# Patient Record
(demographics unavailable — no encounter records)

---

## 2024-10-30 NOTE — BEGINNING OF VISIT
[0] : 2) Feeling down, depressed, or hopeless: Not at all (0) [PHQ-2 Negative] : PHQ-2 Negative [PHQ-9 Negative] : PHQ-9 Negative [[id number]] : 0 [Pain Scale: ___] : On a scale of 1-10, today the patient's pain is a(n) [unfilled]. [Never] : Never [Date Discussed (MM/DD/YY): ___] : Discussed: [unfilled] [With Patient/Caregiver] : with Patient/Caregiver

## 2024-10-30 NOTE — BEGINNING OF VISIT
[0] : 2) Feeling down, depressed, or hopeless: Not at all (0) [PHQ-2 Negative] : PHQ-2 Negative [PHQ-9 Negative] : PHQ-9 Negative [[id number]] : 0 [Pain Scale: ___] : On a scale of 1-10, today the patient's pain is a(n) [unfilled]. [Never] : Never [Date Discussed (MM/DD/YY): ___] : Discussed: [unfilled] [With Patient/Caregiver] : with Patient/Caregiver

## 2024-10-30 NOTE — BEGINNING OF VISIT
[0] : 2) Feeling down, depressed, or hopeless: Not at all (0) [PHQ-2 Negative] : PHQ-2 Negative [PHQ-9 Negative] : PHQ-9 Negative [[id number]] : 0 [Pain Scale: ___] : On a scale of 1-10, today the patient's pain is a(n) [unfilled]. [Never] : Never [Date Discussed (MM/DD/YY): ___] : Discussed: [unfilled] [With Patient/Caregiver] : with Patient/Caregiver

## 2024-10-30 NOTE — BEGINNING OF VISIT
[0] : 2) Feeling down, depressed, or hopeless: Not at all (0) [PHQ-2 Negative] : PHQ-2 Negative [PHQ-9 Negative] : PHQ-9 Negative [[id number]] : 0 [Pain Scale: ___] : On a scale of 1-10, today the patient's pain is a(n) [unfilled]. [Never] : Never [Date Discussed (MM/DD/YY): ___] : Discussed: [unfilled] [With Patient/Caregiver] : with Patient/Caregiver

## 2024-11-01 NOTE — PHYSICAL EXAM
[Restricted in physically strenuous activity but ambulatory and able to carry out work of a light or sedentary nature] : Status 1- Restricted in physically strenuous activity but ambulatory and able to carry out work of a light or sedentary nature, e.g., light house work, office work [Normal] : affect appropriate [de-identified] : ostomy - looks well.

## 2024-11-01 NOTE — PHYSICAL EXAM
[Restricted in physically strenuous activity but ambulatory and able to carry out work of a light or sedentary nature] : Status 1- Restricted in physically strenuous activity but ambulatory and able to carry out work of a light or sedentary nature, e.g., light house work, office work [Normal] : affect appropriate [de-identified] : ostomy - looks well.

## 2024-11-01 NOTE — ASSESSMENT
[Curative] : Goals of care discussed with patient: Curative [FreeTextEntry1] : # Perforated rectal cancer, adenocarcinoma. - MMRp. - 04/08/2024 s/p sigmoid loop colostomy, was on IV antibiotics. - no metastasis on CT C/A/P - Rectal MRI for staging showed bulky disease 8.4 cm, T3C N0 - start FOLFOX today but unfortunately port was not placed and is pending on Friday, she is feeling well. Plan was to add irinotecan in the future, but she is very hesitant about FOLFIRINOX and we had an extensive discussion I explained the possible benefits of additional irinotecan but decided to proceed with just CapeOx for 3 to 4 months of treatment will aim for 6 cycles followed by long course chemoradiation. - We spoke about side effects of oxaliplatin before we spoke with capecitabine today which included rash as well as diarrhea rarely DPD deficiency severe mucositis and she is aware of approximately 1% chance of death - 05/06/2024 MR pelvis/rectal/anal - Status post diverting sigmoid loop colostomy. Small fat-containing paraumbilical hernia. A 3.2 x 3.0 x 2.3 cm mixed cystic and solid lesion within the central portion of the uterus possibly reflecting a submucosal fibroid or adenomyoma protruding into the endometrial cavity versus a true endometrial lesion. IMPRESSION: 1. Mid to high rectal tumor measuring 8.4 cm straddles the anterior peritoneal reflection, with evidence of EMVI; tumor extends beyond muscularis propria along the retrocervical region and contacts but does not definitely invade the posterior cervix. T category: T3c; 2. Mildly irregular 1.0 cm left external iliac lymph node. 3. A 3.2 cm mixed cystic and solid lesion within the central portion of the uterus was likely present dating back to August 2019, possibly reflecting a submucosal fibroid or adenomyoma protruding into the endometrial cavity; GYN consultation may be of use. - 06/11/2024 started CapeOx as Oxaliplatin and capecitabine 14/21D Q21D. - 06/16/2024 CT A/P - Unchanged oncologic findings as above with large perirectal mass and abscess now contains a small focus of air. Stable resulting right hydronephrosis from obstructing the distal ureter. No emergent findings identified. Refer to prior report from 5/21/2024 for further details. - 07/03/2024 capecitabine dose reduction to 1000 mg PO BID 14/21D due to nausea and vomiting that resolved post IV hydration with Zofran and Pepcid IV. - 09/23/2024 MR Rectal - Since the prior examination on 5/6/2024, the primary tumor shows: Partial treatment response (status post 4 of 6 cycles of chemotherapy). No new lesions. Interval decrease in size of the known mid to high rectal tumor now measuring 6.0 cm in craniocaudal dimension, previously 8.4 cm. There remains gross invasion of the perirectal fat contacting the mesorectal fascia posteriorly (8/23) and the anterior peritoneal reflection (8/13). No lymphadenopathy.  09/25/2024 Labs reviewed and results discussed with the patient and her son; 09/23/2024 MR rectal shows response to the treatment.  Repeat CT chest abdomen and pelvis is pending. She is aware to proceed to soon as possible. If doing well will complete 6 cycles and then offer chemo RT followed by likely surgery unless PAXTON. Her anemia significantly improved on parenteral Venofer - remains clinically stable to continue current management with 20% dose reduction of oxaliplatin due to peripheral neuropathy. Although her family and financial situation may cause interruption of treatment. She is able to continue with today's treatment, but not sure if she will be able to receive the last 6th cycle as she may lose her insurance as of the beginning of October. She is working toward not losing her insurance.  And from our side, we will research all options on how to accommodate the patient with the regiment that she can return back to work.  All questions were answered to satisfaction.  Richa is here for follow-up she had an MRI of the rectal that shows treatment response but will likely need radiation with chemotherapy as well.  She still has repeat chest and abdomen CAT scan pending.  Will continue with cycle 5 of 6 of CapeOx. She is not sure if she could proceed with long course chemoradiation but will speak to her more about this again. We may be able to possibly offer her short course RT if okay with the radiation colleagues. Her main issue is that she has to go back to work.  Will see what CTs are showing and will have her see radiation and finalize treatment plan once she sees radiation oncology  PLAN: - continue CapeOx as Oxaliplatin 130 mg/m2 with 20% dose reduction = 104 mg/m2 = 145 mg IV and capecitabine 1000 mg BID 14/21D for total of 6 cycles - C6/6 GIVE TODAY - LAST TREATMENT. - continue NaCl 0.9% 1000 ml IV hydration with Zofran 8 mg IV and Pepcid 20 mg IV starting on D2 of each cycle. - continue Tramadol 50 mg PO Q8H PRN for pain - ordered. - repeat CT C/A/P scheduled in about 2 weeks in about 11/2024. - repeat MR rectum scheduled on 11/20/2024. - Referred to [facility] for the next step with chemo RT. - Labs today: CBC CMP Mg RTC in 3 weeks with CBC CMP Mg

## 2024-11-01 NOTE — PHYSICAL EXAM
[Restricted in physically strenuous activity but ambulatory and able to carry out work of a light or sedentary nature] : Status 1- Restricted in physically strenuous activity but ambulatory and able to carry out work of a light or sedentary nature, e.g., light house work, office work [Normal] : affect appropriate [de-identified] : ostomy - looks well.

## 2024-11-01 NOTE — ASSESSMENT
[Curative] : Goals of care discussed with patient: Curative [FreeTextEntry1] : # Perforated rectal cancer, adenocarcinoma. - MMRp. - 04/08/2024 s/p sigmoid loop colostomy, was on IV antibiotics. - no metastasis on CT C/A/P - Rectal MRI for staging showed bulky disease 8.4 cm, T3C N0 - start FOLFOX today but unfortunately port was not placed and is pending on Friday, she is feeling well. Plan was to add irinotecan in the future, but she is very hesitant about FOLFIRINOX and we had an extensive discussion I explained the possible benefits of additional irinotecan but decided to proceed with just CapeOx for 3 to 4 months of treatment will aim for 6 cycles followed by long course chemoradiation. - We spoke about side effects of oxaliplatin before we spoke with capecitabine today which included rash as well as diarrhea rarely DPD deficiency severe mucositis and she is aware of approximately 1% chance of death - 05/06/2024 MR pelvis/rectal/anal - Status post diverting sigmoid loop colostomy. Small fat-containing paraumbilical hernia. A 3.2 x 3.0 x 2.3 cm mixed cystic and solid lesion within the central portion of the uterus possibly reflecting a submucosal fibroid or adenomyoma protruding into the endometrial cavity versus a true endometrial lesion. IMPRESSION: 1. Mid to high rectal tumor measuring 8.4 cm straddles the anterior peritoneal reflection, with evidence of EMVI; tumor extends beyond muscularis propria along the retrocervical region and contacts but does not definitely invade the posterior cervix. T category: T3c; 2. Mildly irregular 1.0 cm left external iliac lymph node. 3. A 3.2 cm mixed cystic and solid lesion within the central portion of the uterus was likely present dating back to August 2019, possibly reflecting a submucosal fibroid or adenomyoma protruding into the endometrial cavity; GYN consultation may be of use. - 06/11/2024 started CapeOx as Oxaliplatin and capecitabine 14/21D Q21D. - 06/16/2024 CT A/P - Unchanged oncologic findings as above with large perirectal mass and abscess now contains a small focus of air. Stable resulting right hydronephrosis from obstructing the distal ureter. No emergent findings identified. Refer to prior report from 5/21/2024 for further details. - 07/03/2024 capecitabine dose reduction to 1000 mg PO BID 14/21D due to nausea and vomiting that resolved post IV hydration with Zofran and Pepcid IV. - 09/23/2024 MR Rectal - Since the prior examination on 5/6/2024, the primary tumor shows: Partial treatment response (status post 4 of 6 cycles of chemotherapy). No new lesions. Interval decrease in size of the known mid to high rectal tumor now measuring 6.0 cm in craniocaudal dimension, previously 8.4 cm. There remains gross invasion of the perirectal fat contacting the mesorectal fascia posteriorly (8/23) and the anterior peritoneal reflection (8/13). No lymphadenopathy.  09/25/2024 Labs reviewed and results discussed with the patient and her son; 09/23/2024 MR rectal shows response to the treatment.  Repeat CT chest abdomen and pelvis is pending. She is aware to proceed to soon as possible. If doing well will complete 6 cycles and then offer chemo RT followed by likely surgery unless PAXTON. Her anemia significantly improved on parenteral Venofer - remains clinically stable to continue current management with 20% dose reduction of oxaliplatin due to peripheral neuropathy. Although her family and financial situation may cause interruption of treatment. She is able to continue with today's treatment, but not sure if she will be able to receive the last 6th cycle as she may lose her insurance as of the beginning of October. She is working toward not losing her insurance.  And from our side, we will research all options on how to accommodate the patient with the regiment that she can return back to work.  All questions were answered to satisfaction.  Richa is here for follow-up she had an MRI of the rectal that shows treatment response but will likely need radiation with chemotherapy as well.  She still has repeat chest and abdomen CAT scan pending.  Will continue with cycle 5 of 6 of CapeOx. She is not sure if she could proceed with long course chemoradiation but will speak to her more about this again. We may be able to possibly offer her short course RT if okay with the radiation colleagues. Her main issue is that she has to go back to work.  Will see what CTs are showing and will have her see radiation and finalize treatment plan once she sees radiation oncology  PLAN: - continue CapeOx as Oxaliplatin 130 mg/m2 with 20% dose reduction = 104 mg/m2 = 145 mg IV and capecitabine 1000 mg BID 14/21D for total of 6 cycles - C6/6 GIVE TODAY - LAST TREATMENT. - continue NaCl 0.9% 1000 ml IV hydration with Zofran 8 mg IV and Pepcid 20 mg IV starting on D2 of each cycle. - continue Tramadol 50 mg PO Q8H PRN for pain - ordered. - repeat CT C/A/P scheduled in about 2 weeks in about 11/2024. - repeat MR rectum scheduled on 11/20/2024. - Referred to [facility] for the next step with chemo RT. - Labs today: CBC CMP Mg RTC in 3 weeks with CBC CMP Mg

## 2024-11-01 NOTE — HISTORY OF PRESENT ILLNESS
[Disease: _____________________] : Disease: [unfilled] [T: ___] : T[unfilled] [N: ___] : N[unfilled] [M: ___] : M[unfilled] [AJCC Stage: ____] : AJCC Stage: [unfilled] [de-identified] : 58 y/o female w/ no significant PMHx who presented to [facility] on 3/30/24 complaining of rectal pain associated w/ BRBPR for past several weeks. Workup included a CT scan of the abdomen which demonstrated a low rectal 9cm long segment of mass-like thickening, suspicious for rectal cancer, associated w/ abscess and possible local perforation. Patient was transferred to [facility] for further evaluation and management.  Patient underwent IR drainage of x2 perirectal abscess (10cc purulent fluid drained from each) on 4/2. Abscess cultures were positive for ESBL E.coli. She completed 7 days of Zosyn prior to culture results. Antibiotics were changed to 1g q8h beginning on 4/9.  She is now s/p sigmoid loop colostomy creation with flexible sigmoidoscopy, and biopsies on 4/8/24 with Dr. Coronel. Intra-operatively, patient was noted to have a large necrotizing mass 6cm from anal verge. 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  On 4/12/24 she was cleared for discharge. A midline catheter was placed in the hospital. She will complete IV antibiotics (Ertapenem 1g QD) for 10 days from 4/12 to 4/22  She has been having CRP done as outpatient and they are coming down but still elevated and she was instructed to remain on IV antibiotics for another week.  CT C/A/P did not show any distant metastasis: 3/30 and 3/31/2024: No evidence of acute intrathoracic pathology  Nonspecific lobulated cystic structure within the uterine dome. This may reflect an atypical fibroid. Nonemergent pelvic ultrasound or MRI may be of benefit.  Most recent blood work from 4/24/2024 white blood cell count 6.4 hemoglobin 8.9 platelets 500,000 CMP normal LFTs albumin is 3.2 creatinine 0.7 rest of CMP is normal ferritin was 150 on 411 iron saturation 6%.  Most recent CRP 29 from 430 was 76 on 417  CA 19-9 11 CA 19-9 116 on 3/31/2024  She is under the care of Dr. Coronel. She was seen by inpatient onc team. She as presented in Tumor board. Plan is to start AMIRA with chemotherapy. [de-identified] : adenocarcinoma MMR proficent  [de-identified] : 5/15/2024 She is here for follow up. She had MR rectum:  TUMOR LOCATION: Mid to high rectal tumor Tumor location descriptor: Posterior Distance to anal verge (lowest extent of tumor): 6.2 cm Distance to top of anal sphincter complex (lowest extent of tumor): 3.1 cm Relationship to anterior peritoneal reflection: Straddles. TUMOR CHARACTERISTICS: Craniocaudal length: 8.4 cm Morphology: Polypoid Mucinous: Some mucin. T CATEGORY: T3c (tumor penetrates >5-15 mm beyond muscularis propria); tumor extends approximately 11 mm beyond the muscularis propria along the retrocervical region and contacts but does not definitely invade the posterior cervix (3/8.) EXTRAMURAL VASCULAR INVASION (EMVI): Present Location of EMVI (indicate series and image number): In the presacral region, along the posterior aspect of the tumor (3/15) Mesorectal Fascial (MRF) (FOR T3 ONLY): Distance between tumor and MRF: 0 mm LYMPH NODES (suspicious criteria: round shape, irregular borders, heterogeneous signal intensity): Mesorectal/Superior Rectal Lymph Nodes: N0: No visible lymph nodes/deposits Locoregional (Internal Iliac, Obturator, Inguinal below dentate line): N/A Distant (Common Iliac, External Iliac, Inguinal, Retroperitoneal): Mildly irregular left external iliac node measuring 1.0 x 0.7 cm (4/17.) She completed her antibiotics.  07/03/2024 accompanied by her daughter; Reports nausea started on 3rd day post treatment and had vomited 3 times that lasted for about 1 week. She was in ER for low grade fever and LBP, tramadol by Dr. Coronel did not work. She was d/c on Augmentin for 10 day. She feels much better now.  The patient's daughter reported the patient does not have adequate oral hydration at home.  This is the time when she felt nauseous.  07/24/2024 accompanied by her daughter Carmela and her son; Reports feeling much better after IV hydration with Zofran and Pepcid IV last week. She has no more diarrhea and/or vomiting, still with some occasional nausea.  08/21/2024 accompanied by her son; Reports feeling well at the baseline of her health status, no changes in chronic conditions or new complaints since the last visit.  09/25/2024 She is here today with her son to continue the treatment.  She mentioned that she has a very difficult situation where she may lose her insurance and will not be able to continue her cancer care.  Social work team was involved, but unfortunately unable to resolve the issue.  She will continue to work toward not losing her insurance.  She states that she feels much better after iron infusions.  10/30/2024 She is here accompanied by her daughter and her son.  She is back to work on a light work schedule.  She is happy to be back to work.  She is feeling fine.

## 2024-11-01 NOTE — HISTORY OF PRESENT ILLNESS
[Disease: _____________________] : Disease: [unfilled] [T: ___] : T[unfilled] [N: ___] : N[unfilled] [M: ___] : M[unfilled] [AJCC Stage: ____] : AJCC Stage: [unfilled] [de-identified] : 58 y/o female w/ no significant PMHx who presented to [facility] on 3/30/24 complaining of rectal pain associated w/ BRBPR for past several weeks. Workup included a CT scan of the abdomen which demonstrated a low rectal 9cm long segment of mass-like thickening, suspicious for rectal cancer, associated w/ abscess and possible local perforation. Patient was transferred to [facility] for further evaluation and management.  Patient underwent IR drainage of x2 perirectal abscess (10cc purulent fluid drained from each) on 4/2. Abscess cultures were positive for ESBL E.coli. She completed 7 days of Zosyn prior to culture results. Antibiotics were changed to 1g q8h beginning on 4/9.  She is now s/p sigmoid loop colostomy creation with flexible sigmoidoscopy, and biopsies on 4/8/24 with Dr. Coronel. Intra-operatively, patient was noted to have a large necrotizing mass 6cm from anal verge. 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  On 4/12/24 she was cleared for discharge. A midline catheter was placed in the hospital. She will complete IV antibiotics (Ertapenem 1g QD) for 10 days from 4/12 to 4/22  She has been having CRP done as outpatient and they are coming down but still elevated and she was instructed to remain on IV antibiotics for another week.  CT C/A/P did not show any distant metastasis: 3/30 and 3/31/2024: No evidence of acute intrathoracic pathology  Nonspecific lobulated cystic structure within the uterine dome. This may reflect an atypical fibroid. Nonemergent pelvic ultrasound or MRI may be of benefit.  Most recent blood work from 4/24/2024 white blood cell count 6.4 hemoglobin 8.9 platelets 500,000 CMP normal LFTs albumin is 3.2 creatinine 0.7 rest of CMP is normal ferritin was 150 on 411 iron saturation 6%.  Most recent CRP 29 from 430 was 76 on 417  CA 19-9 11 CA 19-9 116 on 3/31/2024  She is under the care of Dr. Coronel. She was seen by inpatient onc team. She as presented in Tumor board. Plan is to start AMIRA with chemotherapy. [de-identified] : adenocarcinoma MMR proficent  [de-identified] : 5/15/2024 She is here for follow up. She had MR rectum:  TUMOR LOCATION: Mid to high rectal tumor Tumor location descriptor: Posterior Distance to anal verge (lowest extent of tumor): 6.2 cm Distance to top of anal sphincter complex (lowest extent of tumor): 3.1 cm Relationship to anterior peritoneal reflection: Straddles. TUMOR CHARACTERISTICS: Craniocaudal length: 8.4 cm Morphology: Polypoid Mucinous: Some mucin. T CATEGORY: T3c (tumor penetrates >5-15 mm beyond muscularis propria); tumor extends approximately 11 mm beyond the muscularis propria along the retrocervical region and contacts but does not definitely invade the posterior cervix (3/8.) EXTRAMURAL VASCULAR INVASION (EMVI): Present Location of EMVI (indicate series and image number): In the presacral region, along the posterior aspect of the tumor (3/15) Mesorectal Fascial (MRF) (FOR T3 ONLY): Distance between tumor and MRF: 0 mm LYMPH NODES (suspicious criteria: round shape, irregular borders, heterogeneous signal intensity): Mesorectal/Superior Rectal Lymph Nodes: N0: No visible lymph nodes/deposits Locoregional (Internal Iliac, Obturator, Inguinal below dentate line): N/A Distant (Common Iliac, External Iliac, Inguinal, Retroperitoneal): Mildly irregular left external iliac node measuring 1.0 x 0.7 cm (4/17.) She completed her antibiotics.  07/03/2024 accompanied by her daughter; Reports nausea started on 3rd day post treatment and had vomited 3 times that lasted for about 1 week. She was in ER for low grade fever and LBP, tramadol by Dr. Coronel did not work. She was d/c on Augmentin for 10 day. She feels much better now.  The patient's daughter reported the patient does not have adequate oral hydration at home.  This is the time when she felt nauseous.  07/24/2024 accompanied by her daughter Carmela and her son; Reports feeling much better after IV hydration with Zofran and Pepcid IV last week. She has no more diarrhea and/or vomiting, still with some occasional nausea.  08/21/2024 accompanied by her son; Reports feeling well at the baseline of her health status, no changes in chronic conditions or new complaints since the last visit.  09/25/2024 She is here today with her son to continue the treatment.  She mentioned that she has a very difficult situation where she may lose her insurance and will not be able to continue her cancer care.  Social work team was involved, but unfortunately unable to resolve the issue.  She will continue to work toward not losing her insurance.  She states that she feels much better after iron infusions.  10/30/2024 She is here accompanied by her daughter and her son.  She is back to work on a light work schedule.  She is happy to be back to work.  She is feeling fine.

## 2024-11-01 NOTE — ASSESSMENT
[Curative] : Goals of care discussed with patient: Curative [FreeTextEntry1] : # Perforated rectal cancer, adenocarcinoma. - MMRp. - 04/08/2024 s/p sigmoid loop colostomy, was on IV antibiotics. - no metastasis on CT C/A/P - Rectal MRI for staging showed bulky disease 8.4 cm, T3C N0 - start FOLFOX today but unfortunately port was not placed and is pending on Friday, she is feeling well. Plan was to add irinotecan in the future, but she is very hesitant about FOLFIRINOX and we had an extensive discussion I explained the possible benefits of additional irinotecan but decided to proceed with just CapeOx for 3 to 4 months of treatment will aim for 6 cycles followed by long course chemoradiation. - We spoke about side effects of oxaliplatin before we spoke with capecitabine today which included rash as well as diarrhea rarely DPD deficiency severe mucositis and she is aware of approximately 1% chance of death - 05/06/2024 MR pelvis/rectal/anal - Status post diverting sigmoid loop colostomy. Small fat-containing paraumbilical hernia. A 3.2 x 3.0 x 2.3 cm mixed cystic and solid lesion within the central portion of the uterus possibly reflecting a submucosal fibroid or adenomyoma protruding into the endometrial cavity versus a true endometrial lesion. IMPRESSION: 1. Mid to high rectal tumor measuring 8.4 cm straddles the anterior peritoneal reflection, with evidence of EMVI; tumor extends beyond muscularis propria along the retrocervical region and contacts but does not definitely invade the posterior cervix. T category: T3c; 2. Mildly irregular 1.0 cm left external iliac lymph node. 3. A 3.2 cm mixed cystic and solid lesion within the central portion of the uterus was likely present dating back to August 2019, possibly reflecting a submucosal fibroid or adenomyoma protruding into the endometrial cavity; GYN consultation may be of use. - 06/11/2024 started CapeOx as Oxaliplatin and capecitabine 14/21D Q21D. - 06/16/2024 CT A/P - Unchanged oncologic findings as above with large perirectal mass and abscess now contains a small focus of air. Stable resulting right hydronephrosis from obstructing the distal ureter. No emergent findings identified. Refer to prior report from 5/21/2024 for further details. - 07/03/2024 capecitabine dose reduction to 1000 mg PO BID 14/21D due to nausea and vomiting that resolved post IV hydration with Zofran and Pepcid IV. - 09/23/2024 MR Rectal - Since the prior examination on 5/6/2024, the primary tumor shows: Partial treatment response (status post 4 of 6 cycles of chemotherapy). No new lesions. Interval decrease in size of the known mid to high rectal tumor now measuring 6.0 cm in craniocaudal dimension, previously 8.4 cm. There remains gross invasion of the perirectal fat contacting the mesorectal fascia posteriorly (8/23) and the anterior peritoneal reflection (8/13). No lymphadenopathy.  09/25/2024 Labs reviewed and results discussed with the patient and her son; 09/23/2024 MR rectal shows response to the treatment.  Repeat CT chest abdomen and pelvis is pending. She is aware to proceed to soon as possible. If doing well will complete 6 cycles and then offer chemo RT followed by likely surgery unless PAXTON. Her anemia significantly improved on parenteral Venofer - remains clinically stable to continue current management with 20% dose reduction of oxaliplatin due to peripheral neuropathy. Although her family and financial situation may cause interruption of treatment. She is able to continue with today's treatment, but not sure if she will be able to receive the last 6th cycle as she may lose her insurance as of the beginning of October. She is working toward not losing her insurance.  And from our side, we will research all options on how to accommodate the patient with the regiment that she can return back to work.  All questions were answered to satisfaction.  Richa is here for follow-up she had an MRI of the rectal that shows treatment response but will likely need radiation with chemotherapy as well.  She still has repeat chest and abdomen CAT scan pending.  Will continue with cycle 5 of 6 of CapeOx. She is not sure if she could proceed with long course chemoradiation but will speak to her more about this again. We may be able to possibly offer her short course RT if okay with the radiation colleagues. Her main issue is that she has to go back to work.  Will see what CTs are showing and will have her see radiation and finalize treatment plan once she sees radiation oncology  PLAN: - continue CapeOx as Oxaliplatin 130 mg/m2 with 20% dose reduction = 104 mg/m2 = 145 mg IV and capecitabine 1000 mg BID 14/21D for total of 6 cycles - C6/6 GIVE TODAY - LAST TREATMENT. - continue NaCl 0.9% 1000 ml IV hydration with Zofran 8 mg IV and Pepcid 20 mg IV starting on D2 of each cycle. - continue Tramadol 50 mg PO Q8H PRN for pain - ordered. - repeat CT C/A/P scheduled in about 2 weeks in about 11/2024. - repeat MR rectum scheduled on 11/20/2024. - Referred to [facility] for the next step with chemo RT. - Labs today: CBC CMP Mg RTC in 3 weeks with CBC CMP Mg

## 2024-11-01 NOTE — HISTORY OF PRESENT ILLNESS
[Disease: _____________________] : Disease: [unfilled] [T: ___] : T[unfilled] [N: ___] : N[unfilled] [M: ___] : M[unfilled] [AJCC Stage: ____] : AJCC Stage: [unfilled] [de-identified] : 58 y/o female w/ no significant PMHx who presented to [facility] on 3/30/24 complaining of rectal pain associated w/ BRBPR for past several weeks. Workup included a CT scan of the abdomen which demonstrated a low rectal 9cm long segment of mass-like thickening, suspicious for rectal cancer, associated w/ abscess and possible local perforation. Patient was transferred to [facility] for further evaluation and management.  Patient underwent IR drainage of x2 perirectal abscess (10cc purulent fluid drained from each) on 4/2. Abscess cultures were positive for ESBL E.coli. She completed 7 days of Zosyn prior to culture results. Antibiotics were changed to 1g q8h beginning on 4/9.  She is now s/p sigmoid loop colostomy creation with flexible sigmoidoscopy, and biopsies on 4/8/24 with Dr. Coronel. Intra-operatively, patient was noted to have a large necrotizing mass 6cm from anal verge. 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  On 4/12/24 she was cleared for discharge. A midline catheter was placed in the hospital. She will complete IV antibiotics (Ertapenem 1g QD) for 10 days from 4/12 to 4/22  She has been having CRP done as outpatient and they are coming down but still elevated and she was instructed to remain on IV antibiotics for another week.  CT C/A/P did not show any distant metastasis: 3/30 and 3/31/2024: No evidence of acute intrathoracic pathology  Nonspecific lobulated cystic structure within the uterine dome. This may reflect an atypical fibroid. Nonemergent pelvic ultrasound or MRI may be of benefit.  Most recent blood work from 4/24/2024 white blood cell count 6.4 hemoglobin 8.9 platelets 500,000 CMP normal LFTs albumin is 3.2 creatinine 0.7 rest of CMP is normal ferritin was 150 on 411 iron saturation 6%.  Most recent CRP 29 from 430 was 76 on 417  CA 19-9 11 CA 19-9 116 on 3/31/2024  She is under the care of Dr. Coronel. She was seen by inpatient onc team. She as presented in Tumor board. Plan is to start AMIRA with chemotherapy. [de-identified] : adenocarcinoma MMR proficent  [de-identified] : 5/15/2024 She is here for follow up. She had MR rectum:  TUMOR LOCATION: Mid to high rectal tumor Tumor location descriptor: Posterior Distance to anal verge (lowest extent of tumor): 6.2 cm Distance to top of anal sphincter complex (lowest extent of tumor): 3.1 cm Relationship to anterior peritoneal reflection: Straddles. TUMOR CHARACTERISTICS: Craniocaudal length: 8.4 cm Morphology: Polypoid Mucinous: Some mucin. T CATEGORY: T3c (tumor penetrates >5-15 mm beyond muscularis propria); tumor extends approximately 11 mm beyond the muscularis propria along the retrocervical region and contacts but does not definitely invade the posterior cervix (3/8.) EXTRAMURAL VASCULAR INVASION (EMVI): Present Location of EMVI (indicate series and image number): In the presacral region, along the posterior aspect of the tumor (3/15) Mesorectal Fascial (MRF) (FOR T3 ONLY): Distance between tumor and MRF: 0 mm LYMPH NODES (suspicious criteria: round shape, irregular borders, heterogeneous signal intensity): Mesorectal/Superior Rectal Lymph Nodes: N0: No visible lymph nodes/deposits Locoregional (Internal Iliac, Obturator, Inguinal below dentate line): N/A Distant (Common Iliac, External Iliac, Inguinal, Retroperitoneal): Mildly irregular left external iliac node measuring 1.0 x 0.7 cm (4/17.) She completed her antibiotics.  07/03/2024 accompanied by her daughter; Reports nausea started on 3rd day post treatment and had vomited 3 times that lasted for about 1 week. She was in ER for low grade fever and LBP, tramadol by Dr. Coronel did not work. She was d/c on Augmentin for 10 day. She feels much better now.  The patient's daughter reported the patient does not have adequate oral hydration at home.  This is the time when she felt nauseous.  07/24/2024 accompanied by her daughter Carmela and her son; Reports feeling much better after IV hydration with Zofran and Pepcid IV last week. She has no more diarrhea and/or vomiting, still with some occasional nausea.  08/21/2024 accompanied by her son; Reports feeling well at the baseline of her health status, no changes in chronic conditions or new complaints since the last visit.  09/25/2024 She is here today with her son to continue the treatment.  She mentioned that she has a very difficult situation where she may lose her insurance and will not be able to continue her cancer care.  Social work team was involved, but unfortunately unable to resolve the issue.  She will continue to work toward not losing her insurance.  She states that she feels much better after iron infusions.  10/30/2024 She is here accompanied by her daughter and her son.  She is back to work on a light work schedule.  She is happy to be back to work.  She is feeling fine.

## 2024-11-01 NOTE — HISTORY OF PRESENT ILLNESS
[Disease: _____________________] : Disease: [unfilled] [T: ___] : T[unfilled] [N: ___] : N[unfilled] [M: ___] : M[unfilled] [AJCC Stage: ____] : AJCC Stage: [unfilled] [de-identified] : 56 y/o female w/ no significant PMHx who presented to [facility] on 3/30/24 complaining of rectal pain associated w/ BRBPR for past several weeks. Workup included a CT scan of the abdomen which demonstrated a low rectal 9cm long segment of mass-like thickening, suspicious for rectal cancer, associated w/ abscess and possible local perforation. Patient was transferred to [facility] for further evaluation and management.  Patient underwent IR drainage of x2 perirectal abscess (10cc purulent fluid drained from each) on 4/2. Abscess cultures were positive for ESBL E.coli. She completed 7 days of Zosyn prior to culture results. Antibiotics were changed to 1g q8h beginning on 4/9.  She is now s/p sigmoid loop colostomy creation with flexible sigmoidoscopy, and biopsies on 4/8/24 with Dr. Coronel. Intra-operatively, patient was noted to have a large necrotizing mass 6cm from anal verge. 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  On 4/12/24 she was cleared for discharge. A midline catheter was placed in the hospital. She will complete IV antibiotics (Ertapenem 1g QD) for 10 days from 4/12 to 4/22  She has been having CRP done as outpatient and they are coming down but still elevated and she was instructed to remain on IV antibiotics for another week.  CT C/A/P did not show any distant metastasis: 3/30 and 3/31/2024: No evidence of acute intrathoracic pathology  Nonspecific lobulated cystic structure within the uterine dome. This may reflect an atypical fibroid. Nonemergent pelvic ultrasound or MRI may be of benefit.  Most recent blood work from 4/24/2024 white blood cell count 6.4 hemoglobin 8.9 platelets 500,000 CMP normal LFTs albumin is 3.2 creatinine 0.7 rest of CMP is normal ferritin was 150 on 411 iron saturation 6%.  Most recent CRP 29 from 430 was 76 on 417  CA 19-9 11 CA 19-9 116 on 3/31/2024  She is under the care of Dr. Coronel. She was seen by inpatient onc team. She as presented in Tumor board. Plan is to start AMIRA with chemotherapy. [de-identified] : adenocarcinoma MMR proficent  [de-identified] : 5/15/2024 She is here for follow up. She had MR rectum:  TUMOR LOCATION: Mid to high rectal tumor Tumor location descriptor: Posterior Distance to anal verge (lowest extent of tumor): 6.2 cm Distance to top of anal sphincter complex (lowest extent of tumor): 3.1 cm Relationship to anterior peritoneal reflection: Straddles. TUMOR CHARACTERISTICS: Craniocaudal length: 8.4 cm Morphology: Polypoid Mucinous: Some mucin. T CATEGORY: T3c (tumor penetrates >5-15 mm beyond muscularis propria); tumor extends approximately 11 mm beyond the muscularis propria along the retrocervical region and contacts but does not definitely invade the posterior cervix (3/8.) EXTRAMURAL VASCULAR INVASION (EMVI): Present Location of EMVI (indicate series and image number): In the presacral region, along the posterior aspect of the tumor (3/15) Mesorectal Fascial (MRF) (FOR T3 ONLY): Distance between tumor and MRF: 0 mm LYMPH NODES (suspicious criteria: round shape, irregular borders, heterogeneous signal intensity): Mesorectal/Superior Rectal Lymph Nodes: N0: No visible lymph nodes/deposits Locoregional (Internal Iliac, Obturator, Inguinal below dentate line): N/A Distant (Common Iliac, External Iliac, Inguinal, Retroperitoneal): Mildly irregular left external iliac node measuring 1.0 x 0.7 cm (4/17.) She completed her antibiotics.  07/03/2024 accompanied by her daughter; Reports nausea started on 3rd day post treatment and had vomited 3 times that lasted for about 1 week. She was in ER for low grade fever and LBP, tramadol by Dr. Coronel did not work. She was d/c on Augmentin for 10 day. She feels much better now.  The patient's daughter reported the patient does not have adequate oral hydration at home.  This is the time when she felt nauseous.  07/24/2024 accompanied by her daughter Carmela and her son; Reports feeling much better after IV hydration with Zofran and Pepcid IV last week. She has no more diarrhea and/or vomiting, still with some occasional nausea.  08/21/2024 accompanied by her son; Reports feeling well at the baseline of her health status, no changes in chronic conditions or new complaints since the last visit.  09/25/2024 She is here today with her son to continue the treatment.  She mentioned that she has a very difficult situation where she may lose her insurance and will not be able to continue her cancer care.  Social work team was involved, but unfortunately unable to resolve the issue.  She will continue to work toward not losing her insurance.  She states that she feels much better after iron infusions.  10/30/2024 She is here accompanied by her daughter and her son.  She is back to work on a light work schedule.  She is happy to be back to work.  She is feeling fine.

## 2024-11-01 NOTE — REVIEW OF SYSTEMS
[Fatigue] : fatigue [Diarrhea: Grade 0] : Diarrhea: Grade 0 [Negative] : Allergic/Immunologic [FreeTextEntry7] : rectal bleeding - resolved.

## 2024-11-01 NOTE — PHYSICAL EXAM
[Restricted in physically strenuous activity but ambulatory and able to carry out work of a light or sedentary nature] : Status 1- Restricted in physically strenuous activity but ambulatory and able to carry out work of a light or sedentary nature, e.g., light house work, office work [Normal] : affect appropriate [de-identified] : ostomy - looks well.

## 2024-11-01 NOTE — END OF VISIT
[FreeTextEntry3] : I was physically present for the key portions of the evaluation and management service provided.  I agree with the history and physical, and plan which I have reviewed and edited where appropriate.  Richa is here for follow-up she remains on CapeOx due for cycle 6 she is pending repeat imaging in November we also referred patient for radiation medicine evaluation for chemoradiation evaluation and we will proceed from there blood work was done she will see us back in 3 weeks

## 2024-11-01 NOTE — ASSESSMENT
[Curative] : Goals of care discussed with patient: Curative [FreeTextEntry1] : # Perforated rectal cancer, adenocarcinoma. - MMRp. - 04/08/2024 s/p sigmoid loop colostomy, was on IV antibiotics. - no metastasis on CT C/A/P - Rectal MRI for staging showed bulky disease 8.4 cm, T3C N0 - start FOLFOX today but unfortunately port was not placed and is pending on Friday, she is feeling well. Plan was to add irinotecan in the future, but she is very hesitant about FOLFIRINOX and we had an extensive discussion I explained the possible benefits of additional irinotecan but decided to proceed with just CapeOx for 3 to 4 months of treatment will aim for 6 cycles followed by long course chemoradiation. - We spoke about side effects of oxaliplatin before we spoke with capecitabine today which included rash as well as diarrhea rarely DPD deficiency severe mucositis and she is aware of approximately 1% chance of death - 05/06/2024 MR pelvis/rectal/anal - Status post diverting sigmoid loop colostomy. Small fat-containing paraumbilical hernia. A 3.2 x 3.0 x 2.3 cm mixed cystic and solid lesion within the central portion of the uterus possibly reflecting a submucosal fibroid or adenomyoma protruding into the endometrial cavity versus a true endometrial lesion. IMPRESSION: 1. Mid to high rectal tumor measuring 8.4 cm straddles the anterior peritoneal reflection, with evidence of EMVI; tumor extends beyond muscularis propria along the retrocervical region and contacts but does not definitely invade the posterior cervix. T category: T3c; 2. Mildly irregular 1.0 cm left external iliac lymph node. 3. A 3.2 cm mixed cystic and solid lesion within the central portion of the uterus was likely present dating back to August 2019, possibly reflecting a submucosal fibroid or adenomyoma protruding into the endometrial cavity; GYN consultation may be of use. - 06/11/2024 started CapeOx as Oxaliplatin and capecitabine 14/21D Q21D. - 06/16/2024 CT A/P - Unchanged oncologic findings as above with large perirectal mass and abscess now contains a small focus of air. Stable resulting right hydronephrosis from obstructing the distal ureter. No emergent findings identified. Refer to prior report from 5/21/2024 for further details. - 07/03/2024 capecitabine dose reduction to 1000 mg PO BID 14/21D due to nausea and vomiting that resolved post IV hydration with Zofran and Pepcid IV. - 09/23/2024 MR Rectal - Since the prior examination on 5/6/2024, the primary tumor shows: Partial treatment response (status post 4 of 6 cycles of chemotherapy). No new lesions. Interval decrease in size of the known mid to high rectal tumor now measuring 6.0 cm in craniocaudal dimension, previously 8.4 cm. There remains gross invasion of the perirectal fat contacting the mesorectal fascia posteriorly (8/23) and the anterior peritoneal reflection (8/13). No lymphadenopathy.  09/25/2024 Labs reviewed and results discussed with the patient and her son; 09/23/2024 MR rectal shows response to the treatment.  Repeat CT chest abdomen and pelvis is pending. She is aware to proceed to soon as possible. If doing well will complete 6 cycles and then offer chemo RT followed by likely surgery unless PAXTON. Her anemia significantly improved on parenteral Venofer - remains clinically stable to continue current management with 20% dose reduction of oxaliplatin due to peripheral neuropathy. Although her family and financial situation may cause interruption of treatment. She is able to continue with today's treatment, but not sure if she will be able to receive the last 6th cycle as she may lose her insurance as of the beginning of October. She is working toward not losing her insurance.  And from our side, we will research all options on how to accommodate the patient with the regiment that she can return back to work.  All questions were answered to satisfaction.  Richa is here for follow-up she had an MRI of the rectal that shows treatment response but will likely need radiation with chemotherapy as well.  She still has repeat chest and abdomen CAT scan pending.  Will continue with cycle 5 of 6 of CapeOx. She is not sure if she could proceed with long course chemoradiation but will speak to her more about this again. We may be able to possibly offer her short course RT if okay with the radiation colleagues. Her main issue is that she has to go back to work.  Will see what CTs are showing and will have her see radiation and finalize treatment plan once she sees radiation oncology  PLAN: - continue CapeOx as Oxaliplatin 130 mg/m2 with 20% dose reduction = 104 mg/m2 = 145 mg IV and capecitabine 1000 mg BID 14/21D for total of 6 cycles - C6/6 GIVE TODAY - LAST TREATMENT. - continue NaCl 0.9% 1000 ml IV hydration with Zofran 8 mg IV and Pepcid 20 mg IV starting on D2 of each cycle. - continue Tramadol 50 mg PO Q8H PRN for pain - ordered. - repeat CT C/A/P scheduled in about 2 weeks in about 11/2024. - repeat MR rectum scheduled on 11/20/2024. - Referred to [facility] for the next step with chemo RT. - Labs today: CBC CMP Mg RTC in 3 weeks with CBC CMP Mg

## 2024-12-12 NOTE — REASON FOR VISIT
[Consideration of Curative Therapy] : consideration of curative therapy for [Family Member] : family member [Rectal Cancer] : cancer [Other: ___] : [unfilled]

## 2024-12-12 NOTE — VITALS
[Maximal Pain Intensity: 7/10] : 7/10 [Least Pain Intensity: 1/10] : 1/10 [Pain Location: ___] : Pain Location: [unfilled] [Opioid] : opioid [80: Normal activity with effort; some signs or symptoms of disease.] : 80: Normal activity with effort; some signs or symptoms of disease.  [Date: ____________] : Patient's last distress assessment performed on [unfilled]. [1 - Distress Level] : Distress Level: 1 [Referred Patient  to social work for follow-up] : Patient was referred to social work for follow-up [Patient given social work contact information and resource sheet] : Patient was given social work contact information and resource sheet

## 2024-12-15 NOTE — LETTER CLOSING
[Consult Closing:] : Thank you for allowing me to participate in the care of this patient.  If you have any questions, please do not hesitate to contact me. [Sincerely yours,] : Sincerely yours, [FreeTextEntry3] : Iman Muse M.D.   Electronically proofread and signed by:  Iman Muse MD Attending, Department of Radiation Medicine [facility]

## 2024-12-15 NOTE — LETTER CLOSING
[Consult Closing:] : Thank you for allowing me to participate in the care of this patient.  If you have any questions, please do not hesitate to contact me. [Sincerely yours,] : Sincerely yours, [FreeTextEntry3] : Iman Muse M.D.   Electronically proofread and signed by:  Iman Muse MD Attending, Department of Radiation Medicine [facility]

## 2024-12-15 NOTE — REVIEW OF SYSTEMS
[Patient Intake Form Reviewed] : Patient intake form was reviewed [Fatigue] : fatigue [Diarrhea] : diarrhea [Negative] : Psychiatric [Fever] : no fever [Chills] : no chills [Chest Pain] : no chest pain [Shortness Of Breath] : no shortness of breath [Abdominal Pain] : no abdominal pain [Urinary Frequency] : no change in urinary frequency [Vaginal Discharge] : no vaginal discharge [Dysmenorrhea/Abn Vaginal Bleeding] : no dysmenorrhea/abnormal vaginal bleeding [Joint Pain] : no joint pain [FreeTextEntry2] : base weight 120 lbs [FreeTextEntry7] : at times - colostomy bag

## 2024-12-15 NOTE — PHYSICAL EXAM
[Sclera] : the sclera and conjunctiva were normal [Hearing Threshold Finger Rub Not [place]] : hearing was normal [] : no respiratory distress [Respiration, Rhythm And Depth] : normal respiratory rhythm and effort [Exaggerated Use Of Accessory Muscles For Inspiration] : no accessory muscle use [Abdomen Soft] : soft [Nondistended] : nondistended [Abdomen Ostomy Present] : an ostomy was noted [LLQ] : in the LLQ [Intestinal Content] : intestinal output [___ Anterior] : a [unfilled] ~Ucm rectal mass was present anteriorly [No Lesions] : no lesions were seen on the external genitalia [No Bleeding] : there was no active vaginal bleeding [Bluish Discoloration] : had a bluish discoloration [Femoral Lymph Nodes Enlarged Bilaterally] : femoral [Inguinal Lymph Nodes Enlarged Bilaterally] : inguinal [Nail Clubbing] : no clubbing  or cyanosis of the fingernails [Skin Color & Pigmentation] : normal skin color and pigmentation [No Focal Deficits] : no focal deficits [Motor Exam] : the motor exam was normal [Excoriation] : no perianal excoriation [Multiple Sinus Tracts] : no perianal sinus tracts [Ulcer ___cm] : no ulcer [Mass ___ cm] : no mass [Discharge] : no discharge [Normal] : no palpable adenopathy [Normal External Genitalia] : normal external genitalia  [FreeTextEntry1] : There was a bulky rectal mass noted about 5-6cm from the verge.  solid, fixed, with pressure anteriorly toward the vagina/cervix.  Mild clear mucus discharge.  [de-identified] : On speculum exam, no ulceration or vaginal bleeding noted.  The cervix was hyperemic. No clear breach of vaginal mucosa.

## 2024-12-15 NOTE — LETTER CLOSING
[Consult Closing:] : Thank you for allowing me to participate in the care of this patient.  If you have any questions, please do not hesitate to contact me. [Sincerely yours,] : Sincerely yours, [FreeTextEntry3] : Iman Muse M.D.   Electronically proofread and signed by:  Iman Muse MD Attending, Department of Radiation Medicine [facility]

## 2024-12-15 NOTE — HISTORY OF PRESENT ILLNESS
[FreeTextEntry1] : Patient is 57-year-old woman who initially presented to the ED [facility] on 3/30/2024 with complaints of rectal/pelvic pain associated with rectal bleeding X several weeks.  CT A/P (3/30/2024) showed a low rectal 9 cm long segment of mass-like thickening, suspicious for rectal cancer. Perirectal air-fluid collection, difficult to measure, consistent with abscess with local perforation, presumably from necrotic perforated rectal mass.  Work-up includes:   3/30/2024 CT head shows, no evidence of acute intracranial pathology. On 4/2/2024, she underwent IR drainage of perirectal abscess (X2) perirectal fluid and pathology was negative for malignant cells.  Extensive acute inflammation, suggestive of an aspirated abscess content in the proper clinical context. Abscess cultures were positive for ESBL E.coli. s/p ABX regimen.   On 4/8/2024, she underwent laparoscopic creation of diverting loop sigmoid colostomy with flexible sigmoidoscopy and biopsy with Dr. Coronel.  The lesion was found at approximately 6 cm from the anal verge with significant necrosis of the lesion.  Pathology showed rectal mass, fragment of infiltrative adenocarcinoma with necrosis, moderately differentiated. measuring 1.5 x 1 x 1 cm.  The entire specimen is submitted. 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  5/6/2024 MRI A/P for rectal cancer staging, COMPARISON MR: None. Correlation is made with abdominopelvic CT dated August 22, 2019 and March 30, 2024 shows, mid to high rectal tumor measuring 8.4 cm straddles the anterior peritoneal reflection, with evidence of EMVI; tumor extends beyond muscularis propria along the retrocervical region and contacts but does not definitely invade the posterior cervix. T category: T3c Mildly irregular 1.0 cm left external iliac lymph node. A 3.2 cm mixed cystic and solid lesion within the central portion of the uterus was likely present dating back to August 2019, possibly reflecting a submucosal fibroid or adenomyoma protruding into the endometrial cavity; GYN consultation may be of use. ADDITIONAL COMMENTS: Status post diverting sigmoid loop colostomy. Small fat-containing paraumbilical hernia. A 3.2 x 3.0 x 2.3 cm mixed cystic and solid lesion within the central portion of the uterus possibly reflecting a submucosal fibroid or adenomyoma protruding into the endometrial cavity versus a true endometrial lesion.  5/21/2024 CT A/P shows, redemonstration of large rectal mass measuring approximately 7.0 x 7.4 cm with a new rim-enhancing fluid collection within the rectum measuring 2.4 x 3.2 cm consistent with abscess. Interval creation of left lower quadrant sigmoid diverting loop colostomy. Moderate right hydroureteronephrosis secondary to compression of the distal ureter by the rectal mass, new since 3/30/2024. Abutting the falciform ligament is a slightly heterogeneous low attenuating area measuring up to 2.6 cm appears either new or more conspicuous on the current study due to slightly different phases of contrast timing and may represent focal fatty infiltration though not entirely specific in its appearance. Attention on follow-up recommended versus further evaluation with nonemergent liver MRI.  She established care with Dr. Hurtado for systemic therapy, options were discussed.  It is noted, she was very hesitant about FOLFIRINOX.  She decided to proceed with just CapeOx for 3 to 4 months of treatment, will aim for 6 cycles followed by long course chemoradiation.  She received CapeOx with 20% dose reduction 2/2 peripheral neuropathy.   Her last cycle was completed on 10/30/2024.   9/23/2024 MRI pelvis/rectal/anus, COMPARISON: 5/6/2024. Correlation made with CT abdomen pelvis on 6/16/2024, the primary tumor shows: Partial treatment response (status post 4 of 6 cycles of chemotherapy) No new lesions. Interval decrease in size of the known mid to high rectal tumor now measuring 6.0 cm in craniocaudal dimension, previously 8.4 cm. There remains gross invasion of the perirectal fat contacting the mesorectal fascia posteriorly and the anterior peritoneal reflection. No lymphadenopathy. ADDITIONAL COMMENTS: Status post diverting sigmoid loop colostomy. Stable 3.2 x 3.0 x 2.3 cm mixed cystic and solid lesion within the central portion of the uterus possibly reflecting a submucosal fibroid or adenomyoma protruding into the endometrial cavity versus a true endometrial lesion.  On 11/22/2024 CT C/A/P COMPARISON: CT abdomen and pelvis June 16, 2024.CT chest March 31, 2024, shows, left lower quadrant colostomy device. No bowel obstruction or ascites.  No significant interval change in large complex rectal/perirectal pelvic mass.   No suspicious hepatic or intrathoracic lesions.  She is here with her daughter and son to discuss radiation therapy.   Patient is an employee of [employer], and her daughter is an RN for [employer] too.  Patient reports of mild - mod mucus rectal discharge, blood tinged at times, as well as pain in the left lower groin region.

## 2024-12-15 NOTE — PHYSICAL EXAM
[Sclera] : the sclera and conjunctiva were normal [Hearing Threshold Finger Rub Not [place]] : hearing was normal [] : no respiratory distress [Respiration, Rhythm And Depth] : normal respiratory rhythm and effort [Exaggerated Use Of Accessory Muscles For Inspiration] : no accessory muscle use [Abdomen Soft] : soft [Nondistended] : nondistended [Abdomen Ostomy Present] : an ostomy was noted [LLQ] : in the LLQ [Intestinal Content] : intestinal output [___ Anterior] : a [unfilled] ~Ucm rectal mass was present anteriorly [No Lesions] : no lesions were seen on the external genitalia [No Bleeding] : there was no active vaginal bleeding [Bluish Discoloration] : had a bluish discoloration [Femoral Lymph Nodes Enlarged Bilaterally] : femoral [Inguinal Lymph Nodes Enlarged Bilaterally] : inguinal [Nail Clubbing] : no clubbing  or cyanosis of the fingernails [Skin Color & Pigmentation] : normal skin color and pigmentation [No Focal Deficits] : no focal deficits [Motor Exam] : the motor exam was normal [Excoriation] : no perianal excoriation [Multiple Sinus Tracts] : no perianal sinus tracts [Ulcer ___cm] : no ulcer [Mass ___ cm] : no mass [Discharge] : no discharge [Normal] : no palpable adenopathy [Normal External Genitalia] : normal external genitalia  [FreeTextEntry1] : There was a bulky rectal mass noted about 5-6cm from the verge.  solid, fixed, with pressure anteriorly toward the vagina/cervix.  Mild clear mucus discharge.  [de-identified] : On speculum exam, no ulceration or vaginal bleeding noted.  The cervix was hyperemic. No clear breach of vaginal mucosa.

## 2024-12-15 NOTE — PHYSICAL EXAM
[Sclera] : the sclera and conjunctiva were normal [Hearing Threshold Finger Rub Not [place]] : hearing was normal [] : no respiratory distress [Respiration, Rhythm And Depth] : normal respiratory rhythm and effort [Exaggerated Use Of Accessory Muscles For Inspiration] : no accessory muscle use [Abdomen Soft] : soft [Nondistended] : nondistended [Abdomen Ostomy Present] : an ostomy was noted [LLQ] : in the LLQ [Intestinal Content] : intestinal output [___ Anterior] : a [unfilled] ~Ucm rectal mass was present anteriorly [No Lesions] : no lesions were seen on the external genitalia [No Bleeding] : there was no active vaginal bleeding [Bluish Discoloration] : had a bluish discoloration [Femoral Lymph Nodes Enlarged Bilaterally] : femoral [Inguinal Lymph Nodes Enlarged Bilaterally] : inguinal [Nail Clubbing] : no clubbing  or cyanosis of the fingernails [Skin Color & Pigmentation] : normal skin color and pigmentation [No Focal Deficits] : no focal deficits [Motor Exam] : the motor exam was normal [Excoriation] : no perianal excoriation [Multiple Sinus Tracts] : no perianal sinus tracts [Ulcer ___cm] : no ulcer [Mass ___ cm] : no mass [Discharge] : no discharge [Normal] : no palpable adenopathy [Normal External Genitalia] : normal external genitalia  [FreeTextEntry1] : There was a bulky rectal mass noted about 5-6cm from the verge.  solid, fixed, with pressure anteriorly toward the vagina/cervix.  Mild clear mucus discharge.  [de-identified] : On speculum exam, no ulceration or vaginal bleeding noted.  The cervix was hyperemic. No clear breach of vaginal mucosa.

## 2024-12-15 NOTE — DISEASE MANAGEMENT
[Clinical] : TNM Stage: c [IIIB] : IIIB [FreeTextEntry4] : invasive adenocarcinoma of the rectum, G2 [TTNM] : 3 [NTNM] : 1 [MTNM] : 0 [de-identified] : pelvis

## 2024-12-15 NOTE — HISTORY OF PRESENT ILLNESS
[FreeTextEntry1] : Patient is 57-year-old woman who initially presented to the ED [facility] on 3/30/2024 with complaints of rectal/pelvic pain associated with rectal bleeding X several weeks.  CT A/P (3/30/2024) showed a low rectal 9 cm long segment of mass-like thickening, suspicious for rectal cancer. Perirectal air-fluid collection, difficult to measure, consistent with abscess with local perforation, presumably from necrotic perforated rectal mass.  Work-up includes:   3/30/2024 CT head shows, no evidence of acute intracranial pathology. On 4/2/2024, she underwent IR drainage of perirectal abscess (X2) perirectal fluid and pathology was negative for malignant cells.  Extensive acute inflammation, suggestive of an aspirated abscess content in the proper clinical context. Abscess cultures were positive for ESBL E.coli. s/p ABX regimen.   On 4/8/2024, she underwent laparoscopic creation of diverting loop sigmoid colostomy with flexible sigmoidoscopy and biopsy with Dr. Coronel.  The lesion was found at approximately 6 cm from the anal verge with significant necrosis of the lesion.  Pathology showed rectal mass, fragment of infiltrative adenocarcinoma with necrosis, moderately differentiated. measuring 1.5 x 1 x 1 cm.  The entire specimen is submitted. 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  5/6/2024 MRI A/P for rectal cancer staging, COMPARISON MR: None. Correlation is made with abdominopelvic CT dated August 22, 2019 and March 30, 2024 shows, mid to high rectal tumor measuring 8.4 cm straddles the anterior peritoneal reflection, with evidence of EMVI; tumor extends beyond muscularis propria along the retrocervical region and contacts but does not definitely invade the posterior cervix. T category: T3c Mildly irregular 1.0 cm left external iliac lymph node. A 3.2 cm mixed cystic and solid lesion within the central portion of the uterus was likely present dating back to August 2019, possibly reflecting a submucosal fibroid or adenomyoma protruding into the endometrial cavity; GYN consultation may be of use. ADDITIONAL COMMENTS: Status post diverting sigmoid loop colostomy. Small fat-containing paraumbilical hernia. A 3.2 x 3.0 x 2.3 cm mixed cystic and solid lesion within the central portion of the uterus possibly reflecting a submucosal fibroid or adenomyoma protruding into the endometrial cavity versus a true endometrial lesion.  5/21/2024 CT A/P shows, redemonstration of large rectal mass measuring approximately 7.0 x 7.4 cm with a new rim-enhancing fluid collection within the rectum measuring 2.4 x 3.2 cm consistent with abscess. Interval creation of left lower quadrant sigmoid diverting loop colostomy. Moderate right hydroureteronephrosis secondary to compression of the distal ureter by the rectal mass, new since 3/30/2024. Abutting the falciform ligament is a slightly heterogeneous low attenuating area measuring up to 2.6 cm appears either new or more conspicuous on the current study due to slightly different phases of contrast timing and may represent focal fatty infiltration though not entirely specific in its appearance. Attention on follow-up recommended versus further evaluation with nonemergent liver MRI.  She established care with Dr. Hurtado for systemic therapy, options were discussed.  It is noted, she was very hesitant about FOLFIRINOX.  She decided to proceed with just CapeOx for 3 to 4 months of treatment, will aim for 6 cycles followed by long course chemoradiation.  She received CapeOx with 20% dose reduction 2/2 peripheral neuropathy.   Her last cycle was completed on 10/30/2024.   9/23/2024 MRI pelvis/rectal/anus, COMPARISON: 5/6/2024. Correlation made with CT abdomen pelvis on 6/16/2024, the primary tumor shows: Partial treatment response (status post 4 of 6 cycles of chemotherapy) No new lesions. Interval decrease in size of the known mid to high rectal tumor now measuring 6.0 cm in craniocaudal dimension, previously 8.4 cm. There remains gross invasion of the perirectal fat contacting the mesorectal fascia posteriorly and the anterior peritoneal reflection. No lymphadenopathy. ADDITIONAL COMMENTS: Status post diverting sigmoid loop colostomy. Stable 3.2 x 3.0 x 2.3 cm mixed cystic and solid lesion within the central portion of the uterus possibly reflecting a submucosal fibroid or adenomyoma protruding into the endometrial cavity versus a true endometrial lesion.  On 11/22/2024 CT C/A/P COMPARISON: CT abdomen and pelvis June 16, 2024.CT chest March 31, 2024, shows, left lower quadrant colostomy device. No bowel obstruction or ascites.  No significant interval change in large complex rectal/perirectal pelvic mass.   No suspicious hepatic or intrathoracic lesions.  She is here with her daughter and son to discuss radiation therapy.   Patient is an employee of [employer], and her daughter is an RN for [employer] too.  Patient reports of mild - mod mucus rectal discharge, blood tinged at times, as well as pain in the left lower groin region.

## 2024-12-15 NOTE — LETTER CLOSING
[Consult Closing:] : Thank you for allowing me to participate in the care of this patient.  If you have any questions, please do not hesitate to contact me. [Sincerely yours,] : Sincerely yours, [FreeTextEntry3] : Iman Muse M.D.   Electronically proofread and signed by:  Iman Muse MD Attending, Department of Radiation Medicine [facility]

## 2024-12-15 NOTE — DISEASE MANAGEMENT
[Clinical] : TNM Stage: c [IIIB] : IIIB [FreeTextEntry4] : invasive adenocarcinoma of the rectum, G2 [TTNM] : 3 [NTNM] : 1 [MTNM] : 0 [de-identified] : pelvis

## 2024-12-15 NOTE — LETTER CLOSING
[Consult Closing:] : Thank you for allowing me to participate in the care of this patient.  If you have any questions, please do not hesitate to contact me. [Sincerely yours,] : Sincerely yours, [FreeTextEntry3] : Iman Muse M.D.   Electronically proofread and signed by:  Iman Muse MD Attending, Department of Radiation Medicine [facility]

## 2024-12-15 NOTE — DISEASE MANAGEMENT
[Clinical] : TNM Stage: c [IIIB] : IIIB [FreeTextEntry4] : invasive adenocarcinoma of the rectum, G2 [TTNM] : 3 [NTNM] : 1 [MTNM] : 0 [de-identified] : pelvis

## 2024-12-15 NOTE — HISTORY OF PRESENT ILLNESS
[FreeTextEntry1] : Patient is 57-year-old woman who initially presented to the ED [facility] on 3/30/2024 with complaints of rectal/pelvic pain associated with rectal bleeding X several weeks.  CT A/P (3/30/2024) showed a low rectal 9 cm long segment of mass-like thickening, suspicious for rectal cancer. Perirectal air-fluid collection, difficult to measure, consistent with abscess with local perforation, presumably from necrotic perforated rectal mass.  Work-up includes:   3/30/2024 CT head shows, no evidence of acute intracranial pathology. On 4/2/2024, she underwent IR drainage of perirectal abscess (X2) perirectal fluid and pathology was negative for malignant cells.  Extensive acute inflammation, suggestive of an aspirated abscess content in the proper clinical context. Abscess cultures were positive for ESBL E.coli. s/p ABX regimen.   On 4/8/2024, she underwent laparoscopic creation of diverting loop sigmoid colostomy with flexible sigmoidoscopy and biopsy with Dr. Coronel.  The lesion was found at approximately 6 cm from the anal verge with significant necrosis of the lesion.  Pathology showed rectal mass, fragment of infiltrative adenocarcinoma with necrosis, moderately differentiated. measuring 1.5 x 1 x 1 cm.  The entire specimen is submitted. 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  5/6/2024 MRI A/P for rectal cancer staging, COMPARISON MR: None. Correlation is made with abdominopelvic CT dated August 22, 2019 and March 30, 2024 shows, mid to high rectal tumor measuring 8.4 cm straddles the anterior peritoneal reflection, with evidence of EMVI; tumor extends beyond muscularis propria along the retrocervical region and contacts but does not definitely invade the posterior cervix. T category: T3c Mildly irregular 1.0 cm left external iliac lymph node. A 3.2 cm mixed cystic and solid lesion within the central portion of the uterus was likely present dating back to August 2019, possibly reflecting a submucosal fibroid or adenomyoma protruding into the endometrial cavity; GYN consultation may be of use. ADDITIONAL COMMENTS: Status post diverting sigmoid loop colostomy. Small fat-containing paraumbilical hernia. A 3.2 x 3.0 x 2.3 cm mixed cystic and solid lesion within the central portion of the uterus possibly reflecting a submucosal fibroid or adenomyoma protruding into the endometrial cavity versus a true endometrial lesion.  5/21/2024 CT A/P shows, redemonstration of large rectal mass measuring approximately 7.0 x 7.4 cm with a new rim-enhancing fluid collection within the rectum measuring 2.4 x 3.2 cm consistent with abscess. Interval creation of left lower quadrant sigmoid diverting loop colostomy. Moderate right hydroureteronephrosis secondary to compression of the distal ureter by the rectal mass, new since 3/30/2024. Abutting the falciform ligament is a slightly heterogeneous low attenuating area measuring up to 2.6 cm appears either new or more conspicuous on the current study due to slightly different phases of contrast timing and may represent focal fatty infiltration though not entirely specific in its appearance. Attention on follow-up recommended versus further evaluation with nonemergent liver MRI.  She established care with Dr. Hurtado for systemic therapy, options were discussed.  It is noted, she was very hesitant about FOLFIRINOX.  She decided to proceed with just CapeOx for 3 to 4 months of treatment, will aim for 6 cycles followed by long course chemoradiation.  She received CapeOx with 20% dose reduction 2/2 peripheral neuropathy.   Her last cycle was completed on 10/30/2024.   9/23/2024 MRI pelvis/rectal/anus, COMPARISON: 5/6/2024. Correlation made with CT abdomen pelvis on 6/16/2024, the primary tumor shows: Partial treatment response (status post 4 of 6 cycles of chemotherapy) No new lesions. Interval decrease in size of the known mid to high rectal tumor now measuring 6.0 cm in craniocaudal dimension, previously 8.4 cm. There remains gross invasion of the perirectal fat contacting the mesorectal fascia posteriorly and the anterior peritoneal reflection. No lymphadenopathy. ADDITIONAL COMMENTS: Status post diverting sigmoid loop colostomy. Stable 3.2 x 3.0 x 2.3 cm mixed cystic and solid lesion within the central portion of the uterus possibly reflecting a submucosal fibroid or adenomyoma protruding into the endometrial cavity versus a true endometrial lesion.  On 11/22/2024 CT C/A/P COMPARISON: CT abdomen and pelvis June 16, 2024.CT chest March 31, 2024, shows, left lower quadrant colostomy device. No bowel obstruction or ascites.  No significant interval change in large complex rectal/perirectal pelvic mass.   No suspicious hepatic or intrathoracic lesions.  She is here with her daughter and son to discuss radiation therapy.   Patient is an employee of [email or web address], and her daughter is an RN for [employer] too.  Patient reports of mild - mod mucus rectal discharge, blood tinged at times, as well as pain in the left lower groin region.

## 2024-12-15 NOTE — LETTER CLOSING
[Consult Closing:] : Thank you for allowing me to participate in the care of this patient.  If you have any questions, please do not hesitate to contact me. [Sincerely yours,] : Sincerely yours, [FreeTextEntry3] : Iman Muse M.D.   Electronically proofread and signed by:  Iman Muse MD Attending, Department of Radiation Medicine [facility]

## 2024-12-15 NOTE — HISTORY OF PRESENT ILLNESS
[FreeTextEntry1] : Patient is 57-year-old woman who initially presented to the ED [facility] on 3/30/2024 with complaints of rectal/pelvic pain associated with rectal bleeding X several weeks.  CT A/P (3/30/2024) showed a low rectal 9 cm long segment of mass-like thickening, suspicious for rectal cancer. Perirectal air-fluid collection, difficult to measure, consistent with abscess with local perforation, presumably from necrotic perforated rectal mass.  Work-up includes:   3/30/2024 CT head shows, no evidence of acute intracranial pathology. On 4/2/2024, she underwent IR drainage of perirectal abscess (X2) perirectal fluid and pathology was negative for malignant cells.  Extensive acute inflammation, suggestive of an aspirated abscess content in the proper clinical context. Abscess cultures were positive for ESBL E.coli. s/p ABX regimen.   On 4/8/2024, she underwent laparoscopic creation of diverting loop sigmoid colostomy with flexible sigmoidoscopy and biopsy with Dr. Coronel.  The lesion was found at approximately 6 cm from the anal verge with significant necrosis of the lesion.  Pathology showed rectal mass, fragment of infiltrative adenocarcinoma with necrosis, moderately differentiated. measuring 1.5 x 1 x 1 cm.  The entire specimen is submitted. 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  5/6/2024 MRI A/P for rectal cancer staging, COMPARISON MR: None. Correlation is made with abdominopelvic CT dated August 22, 2019 and March 30, 2024 shows, mid to high rectal tumor measuring 8.4 cm straddles the anterior peritoneal reflection, with evidence of EMVI; tumor extends beyond muscularis propria along the retrocervical region and contacts but does not definitely invade the posterior cervix. T category: T3c Mildly irregular 1.0 cm left external iliac lymph node. A 3.2 cm mixed cystic and solid lesion within the central portion of the uterus was likely present dating back to August 2019, possibly reflecting a submucosal fibroid or adenomyoma protruding into the endometrial cavity; GYN consultation may be of use. ADDITIONAL COMMENTS: Status post diverting sigmoid loop colostomy. Small fat-containing paraumbilical hernia. A 3.2 x 3.0 x 2.3 cm mixed cystic and solid lesion within the central portion of the uterus possibly reflecting a submucosal fibroid or adenomyoma protruding into the endometrial cavity versus a true endometrial lesion.  5/21/2024 CT A/P shows, redemonstration of large rectal mass measuring approximately 7.0 x 7.4 cm with a new rim-enhancing fluid collection within the rectum measuring 2.4 x 3.2 cm consistent with abscess. Interval creation of left lower quadrant sigmoid diverting loop colostomy. Moderate right hydroureteronephrosis secondary to compression of the distal ureter by the rectal mass, new since 3/30/2024. Abutting the falciform ligament is a slightly heterogeneous low attenuating area measuring up to 2.6 cm appears either new or more conspicuous on the current study due to slightly different phases of contrast timing and may represent focal fatty infiltration though not entirely specific in its appearance. Attention on follow-up recommended versus further evaluation with nonemergent liver MRI.  She established care with Dr. Hurtado for systemic therapy, options were discussed.  It is noted, she was very hesitant about FOLFIRINOX.  She decided to proceed with just CapeOx for 3 to 4 months of treatment, will aim for 6 cycles followed by long course chemoradiation.  She received CapeOx with 20% dose reduction 2/2 peripheral neuropathy.   Her last cycle was completed on 10/30/2024.   9/23/2024 MRI pelvis/rectal/anus, COMPARISON: 5/6/2024. Correlation made with CT abdomen pelvis on 6/16/2024, the primary tumor shows: Partial treatment response (status post 4 of 6 cycles of chemotherapy) No new lesions. Interval decrease in size of the known mid to high rectal tumor now measuring 6.0 cm in craniocaudal dimension, previously 8.4 cm. There remains gross invasion of the perirectal fat contacting the mesorectal fascia posteriorly and the anterior peritoneal reflection. No lymphadenopathy. ADDITIONAL COMMENTS: Status post diverting sigmoid loop colostomy. Stable 3.2 x 3.0 x 2.3 cm mixed cystic and solid lesion within the central portion of the uterus possibly reflecting a submucosal fibroid or adenomyoma protruding into the endometrial cavity versus a true endometrial lesion.  On 11/22/2024 CT C/A/P COMPARISON: CT abdomen and pelvis June 16, 2024.CT chest March 31, 2024, shows, left lower quadrant colostomy device. No bowel obstruction or ascites.  No significant interval change in large complex rectal/perirectal pelvic mass.   No suspicious hepatic or intrathoracic lesions.  She is here with her daughter and son to discuss radiation therapy.   Patient is an employee of [employer], and her daughter is an RN for [employer] too.  Patient reports of mild - mod mucus rectal discharge, blood tinged at times, as well as pain in the left lower groin region.

## 2024-12-15 NOTE — HISTORY OF PRESENT ILLNESS
[FreeTextEntry1] : Patient is 57-year-old woman who initially presented to the ED [facility] on 3/30/2024 with complaints of rectal/pelvic pain associated with rectal bleeding X several weeks.  CT A/P (3/30/2024) showed a low rectal 9 cm long segment of mass-like thickening, suspicious for rectal cancer. Perirectal air-fluid collection, difficult to measure, consistent with abscess with local perforation, presumably from necrotic perforated rectal mass.  Work-up includes:   3/30/2024 CT head shows, no evidence of acute intracranial pathology. On 4/2/2024, she underwent IR drainage of perirectal abscess (X2) perirectal fluid and pathology was negative for malignant cells.  Extensive acute inflammation, suggestive of an aspirated abscess content in the proper clinical context. Abscess cultures were positive for ESBL E.coli. s/p ABX regimen.   On 4/8/2024, she underwent laparoscopic creation of diverting loop sigmoid colostomy with flexible sigmoidoscopy and biopsy with Dr. Coronel.  The lesion was found at approximately 6 cm from the anal verge with significant necrosis of the lesion.  Pathology showed rectal mass, fragment of infiltrative adenocarcinoma with necrosis, moderately differentiated. measuring 1.5 x 1 x 1 cm.  The entire specimen is submitted. 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  5/6/2024 MRI A/P for rectal cancer staging, COMPARISON MR: None. Correlation is made with abdominopelvic CT dated August 22, 2019 and March 30, 2024 shows, mid to high rectal tumor measuring 8.4 cm straddles the anterior peritoneal reflection, with evidence of EMVI; tumor extends beyond muscularis propria along the retrocervical region and contacts but does not definitely invade the posterior cervix. T category: T3c Mildly irregular 1.0 cm left external iliac lymph node. A 3.2 cm mixed cystic and solid lesion within the central portion of the uterus was likely present dating back to August 2019, possibly reflecting a submucosal fibroid or adenomyoma protruding into the endometrial cavity; GYN consultation may be of use. ADDITIONAL COMMENTS: Status post diverting sigmoid loop colostomy. Small fat-containing paraumbilical hernia. A 3.2 x 3.0 x 2.3 cm mixed cystic and solid lesion within the central portion of the uterus possibly reflecting a submucosal fibroid or adenomyoma protruding into the endometrial cavity versus a true endometrial lesion.  5/21/2024 CT A/P shows, redemonstration of large rectal mass measuring approximately 7.0 x 7.4 cm with a new rim-enhancing fluid collection within the rectum measuring 2.4 x 3.2 cm consistent with abscess. Interval creation of left lower quadrant sigmoid diverting loop colostomy. Moderate right hydroureteronephrosis secondary to compression of the distal ureter by the rectal mass, new since 3/30/2024. Abutting the falciform ligament is a slightly heterogeneous low attenuating area measuring up to 2.6 cm appears either new or more conspicuous on the current study due to slightly different phases of contrast timing and may represent focal fatty infiltration though not entirely specific in its appearance. Attention on follow-up recommended versus further evaluation with nonemergent liver MRI.  She established care with Dr. Hurtado for systemic therapy, options were discussed.  It is noted, she was very hesitant about FOLFIRINOX.  She decided to proceed with just CapeOx for 3 to 4 months of treatment, will aim for 6 cycles followed by long course chemoradiation.  She received CapeOx with 20% dose reduction 2/2 peripheral neuropathy.   Her last cycle was completed on 10/30/2024.   9/23/2024 MRI pelvis/rectal/anus, COMPARISON: 5/6/2024. Correlation made with CT abdomen pelvis on 6/16/2024, the primary tumor shows: Partial treatment response (status post 4 of 6 cycles of chemotherapy) No new lesions. Interval decrease in size of the known mid to high rectal tumor now measuring 6.0 cm in craniocaudal dimension, previously 8.4 cm. There remains gross invasion of the perirectal fat contacting the mesorectal fascia posteriorly and the anterior peritoneal reflection. No lymphadenopathy. ADDITIONAL COMMENTS: Status post diverting sigmoid loop colostomy. Stable 3.2 x 3.0 x 2.3 cm mixed cystic and solid lesion within the central portion of the uterus possibly reflecting a submucosal fibroid or adenomyoma protruding into the endometrial cavity versus a true endometrial lesion.  On 11/22/2024 CT C/A/P COMPARISON: CT abdomen and pelvis June 16, 2024.CT chest March 31, 2024, shows, left lower quadrant colostomy device. No bowel obstruction or ascites.  No significant interval change in large complex rectal/perirectal pelvic mass.   No suspicious hepatic or intrathoracic lesions.  She is here with her daughter and son to discuss radiation therapy.   Patient is an employee of [employer], and her daughter is an RN for [employer] too.  Patient reports of mild - mod mucus rectal discharge, blood tinged at times, as well as pain in the left lower groin region.

## 2024-12-15 NOTE — PHYSICAL EXAM
[Sclera] : the sclera and conjunctiva were normal [Hearing Threshold Finger Rub Not [place]] : hearing was normal [] : no respiratory distress [Respiration, Rhythm And Depth] : normal respiratory rhythm and effort [Exaggerated Use Of Accessory Muscles For Inspiration] : no accessory muscle use [Abdomen Soft] : soft [Nondistended] : nondistended [Abdomen Ostomy Present] : an ostomy was noted [LLQ] : in the LLQ [Intestinal Content] : intestinal output [___ Anterior] : a [unfilled] ~Ucm rectal mass was present anteriorly [No Lesions] : no lesions were seen on the external genitalia [No Bleeding] : there was no active vaginal bleeding [Bluish Discoloration] : had a bluish discoloration [Femoral Lymph Nodes Enlarged Bilaterally] : femoral [Inguinal Lymph Nodes Enlarged Bilaterally] : inguinal [Nail Clubbing] : no clubbing  or cyanosis of the fingernails [Skin Color & Pigmentation] : normal skin color and pigmentation [No Focal Deficits] : no focal deficits [Motor Exam] : the motor exam was normal [Excoriation] : no perianal excoriation [Multiple Sinus Tracts] : no perianal sinus tracts [Ulcer ___cm] : no ulcer [Mass ___ cm] : no mass [Discharge] : no discharge [Normal] : no palpable adenopathy [Normal External Genitalia] : normal external genitalia  [FreeTextEntry1] : There was a bulky rectal mass noted about 5-6cm from the verge.  solid, fixed, with pressure anteriorly toward the vagina/cervix.  Mild clear mucus discharge.  [de-identified] : On speculum exam, no ulceration or vaginal bleeding noted.  The cervix was hyperemic. No clear breach of vaginal mucosa.

## 2024-12-15 NOTE — DISEASE MANAGEMENT
[Clinical] : TNM Stage: c [IIIB] : IIIB [FreeTextEntry4] : invasive adenocarcinoma of the rectum, G2 [TTNM] : 3 [NTNM] : 1 [MTNM] : 0 [de-identified] : pelvis

## 2024-12-15 NOTE — PHYSICAL EXAM
[Sclera] : the sclera and conjunctiva were normal [Hearing Threshold Finger Rub Not [place]] : hearing was normal [] : no respiratory distress [Respiration, Rhythm And Depth] : normal respiratory rhythm and effort [Exaggerated Use Of Accessory Muscles For Inspiration] : no accessory muscle use [Abdomen Soft] : soft [Nondistended] : nondistended [Abdomen Ostomy Present] : an ostomy was noted [LLQ] : in the LLQ [Intestinal Content] : intestinal output [___ Anterior] : a [unfilled] ~Ucm rectal mass was present anteriorly [No Lesions] : no lesions were seen on the external genitalia [No Bleeding] : there was no active vaginal bleeding [Bluish Discoloration] : had a bluish discoloration [Femoral Lymph Nodes Enlarged Bilaterally] : femoral [Inguinal Lymph Nodes Enlarged Bilaterally] : inguinal [Nail Clubbing] : no clubbing  or cyanosis of the fingernails [Skin Color & Pigmentation] : normal skin color and pigmentation [No Focal Deficits] : no focal deficits [Motor Exam] : the motor exam was normal [Excoriation] : no perianal excoriation [Multiple Sinus Tracts] : no perianal sinus tracts [Ulcer ___cm] : no ulcer [Mass ___ cm] : no mass [Discharge] : no discharge [Normal] : no palpable adenopathy [Normal External Genitalia] : normal external genitalia  [FreeTextEntry1] : There was a bulky rectal mass noted about 5-6cm from the verge.  solid, fixed, with pressure anteriorly toward the vagina/cervix.  Mild clear mucus discharge.  [de-identified] : On speculum exam, no ulceration or vaginal bleeding noted.  The cervix was hyperemic. No clear breach of vaginal mucosa.

## 2024-12-15 NOTE — HISTORY OF PRESENT ILLNESS
[FreeTextEntry1] : Patient is 57-year-old woman who initially presented to the ED [facility] on 3/30/2024 with complaints of rectal/pelvic pain associated with rectal bleeding X several weeks.  CT A/P (3/30/2024) showed a low rectal 9 cm long segment of mass-like thickening, suspicious for rectal cancer. Perirectal air-fluid collection, difficult to measure, consistent with abscess with local perforation, presumably from necrotic perforated rectal mass.  Work-up includes:   3/30/2024 CT head shows, no evidence of acute intracranial pathology. On 4/2/2024, she underwent IR drainage of perirectal abscess (X2) perirectal fluid and pathology was negative for malignant cells.  Extensive acute inflammation, suggestive of an aspirated abscess content in the proper clinical context. Abscess cultures were positive for ESBL E.coli. s/p ABX regimen.   On 4/8/2024, she underwent laparoscopic creation of diverting loop sigmoid colostomy with flexible sigmoidoscopy and biopsy with Dr. Coronel.  The lesion was found at approximately 6 cm from the anal verge with significant necrosis of the lesion.  Pathology showed rectal mass, fragment of infiltrative adenocarcinoma with necrosis, moderately differentiated. measuring 1.5 x 1 x 1 cm.  The entire specimen is submitted. 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  5/6/2024 MRI A/P for rectal cancer staging, COMPARISON MR: None. Correlation is made with abdominopelvic CT dated August 22, 2019 and March 30, 2024 shows, mid to high rectal tumor measuring 8.4 cm straddles the anterior peritoneal reflection, with evidence of EMVI; tumor extends beyond muscularis propria along the retrocervical region and contacts but does not definitely invade the posterior cervix. T category: T3c Mildly irregular 1.0 cm left external iliac lymph node. A 3.2 cm mixed cystic and solid lesion within the central portion of the uterus was likely present dating back to August 2019, possibly reflecting a submucosal fibroid or adenomyoma protruding into the endometrial cavity; GYN consultation may be of use. ADDITIONAL COMMENTS: Status post diverting sigmoid loop colostomy. Small fat-containing paraumbilical hernia. A 3.2 x 3.0 x 2.3 cm mixed cystic and solid lesion within the central portion of the uterus possibly reflecting a submucosal fibroid or adenomyoma protruding into the endometrial cavity versus a true endometrial lesion.  5/21/2024 CT A/P shows, redemonstration of large rectal mass measuring approximately 7.0 x 7.4 cm with a new rim-enhancing fluid collection within the rectum measuring 2.4 x 3.2 cm consistent with abscess. Interval creation of left lower quadrant sigmoid diverting loop colostomy. Moderate right hydroureteronephrosis secondary to compression of the distal ureter by the rectal mass, new since 3/30/2024. Abutting the falciform ligament is a slightly heterogeneous low attenuating area measuring up to 2.6 cm appears either new or more conspicuous on the current study due to slightly different phases of contrast timing and may represent focal fatty infiltration though not entirely specific in its appearance. Attention on follow-up recommended versus further evaluation with nonemergent liver MRI.  She established care with Dr. Hurtado for systemic therapy, options were discussed.  It is noted, she was very hesitant about FOLFIRINOX.  She decided to proceed with just CapeOx for 3 to 4 months of treatment, will aim for 6 cycles followed by long course chemoradiation.  She received CapeOx with 20% dose reduction 2/2 peripheral neuropathy.   Her last cycle was completed on 10/30/2024.   9/23/2024 MRI pelvis/rectal/anus, COMPARISON: 5/6/2024. Correlation made with CT abdomen pelvis on 6/16/2024, the primary tumor shows: Partial treatment response (status post 4 of 6 cycles of chemotherapy) No new lesions. Interval decrease in size of the known mid to high rectal tumor now measuring 6.0 cm in craniocaudal dimension, previously 8.4 cm. There remains gross invasion of the perirectal fat contacting the mesorectal fascia posteriorly and the anterior peritoneal reflection. No lymphadenopathy. ADDITIONAL COMMENTS: Status post diverting sigmoid loop colostomy. Stable 3.2 x 3.0 x 2.3 cm mixed cystic and solid lesion within the central portion of the uterus possibly reflecting a submucosal fibroid or adenomyoma protruding into the endometrial cavity versus a true endometrial lesion.  On 11/22/2024 CT C/A/P COMPARISON: CT abdomen and pelvis June 16, 2024.CT chest March 31, 2024, shows, left lower quadrant colostomy device. No bowel obstruction or ascites.  No significant interval change in large complex rectal/perirectal pelvic mass.   No suspicious hepatic or intrathoracic lesions.  She is here with her daughter and son to discuss radiation therapy.   Patient is an employee of [employer], and her daughter is an RN for [employer] too.  Patient reports of mild - mod mucus rectal discharge, blood tinged at times, as well as pain in the left lower groin region.

## 2024-12-15 NOTE — PHYSICAL EXAM
[Sclera] : the sclera and conjunctiva were normal [Hearing Threshold Finger Rub Not [place]] : hearing was normal [] : no respiratory distress [Respiration, Rhythm And Depth] : normal respiratory rhythm and effort [Exaggerated Use Of Accessory Muscles For Inspiration] : no accessory muscle use [Abdomen Soft] : soft [Nondistended] : nondistended [Abdomen Ostomy Present] : an ostomy was noted [LLQ] : in the LLQ [Intestinal Content] : intestinal output [___ Anterior] : a [unfilled] ~Ucm rectal mass was present anteriorly [No Lesions] : no lesions were seen on the external genitalia [No Bleeding] : there was no active vaginal bleeding [Bluish Discoloration] : had a bluish discoloration [Femoral Lymph Nodes Enlarged Bilaterally] : femoral [Inguinal Lymph Nodes Enlarged Bilaterally] : inguinal [Nail Clubbing] : no clubbing  or cyanosis of the fingernails [Skin Color & Pigmentation] : normal skin color and pigmentation [No Focal Deficits] : no focal deficits [Motor Exam] : the motor exam was normal [Excoriation] : no perianal excoriation [Multiple Sinus Tracts] : no perianal sinus tracts [Ulcer ___cm] : no ulcer [Mass ___ cm] : no mass [Discharge] : no discharge [Normal] : no palpable adenopathy [Normal External Genitalia] : normal external genitalia  [FreeTextEntry1] : There was a bulky rectal mass noted about 5-6cm from the verge.  solid, fixed, with pressure anteriorly toward the vagina/cervix.  Mild clear mucus discharge.  [de-identified] : On speculum exam, no ulceration or vaginal bleeding noted.  The cervix was hyperemic. No clear breach of vaginal mucosa.

## 2024-12-15 NOTE — DISEASE MANAGEMENT
[Clinical] : TNM Stage: c [IIIB] : IIIB [FreeTextEntry4] : invasive adenocarcinoma of the rectum, G2 [TTNM] : 3 [NTNM] : 1 [MTNM] : 0 [de-identified] : pelvis

## 2024-12-15 NOTE — DISEASE MANAGEMENT
[Clinical] : TNM Stage: c [IIIB] : IIIB [FreeTextEntry4] : invasive adenocarcinoma of the rectum, G2 [TTNM] : 3 [NTNM] : 1 [MTNM] : 0 [de-identified] : pelvis

## 2024-12-16 NOTE — HISTORY OF PRESENT ILLNESS
[Disease: _____________________] : Disease: [unfilled] [T: ___] : T[unfilled] [N: ___] : N[unfilled] [M: ___] : M[unfilled] [AJCC Stage: ____] : AJCC Stage: [unfilled] [de-identified] : 58 y/o female w/ no significant PMHx who presented to [facility] on 3/30/24 complaining of rectal pain associated w/ BRBPR for past several weeks. Workup included a CT scan of the abdomen which demonstrated a low rectal 9cm long segment of mass-like thickening, suspicious for rectal cancer, associated w/ abscess and possible local perforation. Patient was transferred to [facility] for further evaluation and management.  Patient underwent IR drainage of x2 perirectal abscess (10cc purulent fluid drained from each) on 4/2. Abscess cultures were positive for ESBL E.coli. She completed 7 days of Zosyn prior to culture results. Antibiotics were changed to 1g q8h beginning on 4/9.  She is now s/p sigmoid loop colostomy creation with flexible sigmoidoscopy, and biopsies on 4/8/24 with Dr. Coronel. Intra-operatively, patient was noted to have a large necrotizing mass 6cm from anal verge. 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  On 4/12/24 she was cleared for discharge. A midline catheter was placed in the hospital. She will complete IV antibiotics (Ertapenem 1g QD) for 10 days from 4/12 to 4/22  She has been having CRP done as outpatient and they are coming down but still elevated and she was instructed to remain on IV antibiotics for another week.  CT C/A/P did not show any distant metastasis: 3/30 and 3/31/2024: No evidence of acute intrathoracic pathology  Nonspecific lobulated cystic structure within the uterine dome. This may reflect an atypical fibroid. Nonemergent pelvic ultrasound or MRI may be of benefit.  Most recent blood work from 4/24/2024 white blood cell count 6.4 hemoglobin 8.9 platelets 500,000 CMP normal LFTs albumin is 3.2 creatinine 0.7 rest of CMP is normal ferritin was 150 on 411 iron saturation 6%.  Most recent CRP 29 from 430 was 76 on 417  CA 19-9 11 CA 19-9 116 on 3/31/2024  She is under the care of Dr. Coronel. She was seen by inpatient onc team. She as presented in Tumor board. Plan is to start AMIRA with chemotherapy. [de-identified] : adenocarcinoma MMR proficent  [de-identified] : 5/15/2024 She is here for follow up. She had MR rectum:  TUMOR LOCATION: Mid to high rectal tumor Tumor location descriptor: Posterior Distance to anal verge (lowest extent of tumor): 6.2 cm Distance to top of anal sphincter complex (lowest extent of tumor): 3.1 cm Relationship to anterior peritoneal reflection: Straddles. TUMOR CHARACTERISTICS: Craniocaudal length: 8.4 cm Morphology: Polypoid Mucinous: Some mucin. T CATEGORY: T3c (tumor penetrates >5-15 mm beyond muscularis propria); tumor extends approximately 11 mm beyond the muscularis propria along the retrocervical region and contacts but does not definitely invade the posterior cervix (3/8.) EXTRAMURAL VASCULAR INVASION (EMVI): Present Location of EMVI (indicate series and image number): In the presacral region, along the posterior aspect of the tumor (3/15) Mesorectal Fascial (MRF) (FOR T3 ONLY): Distance between tumor and MRF: 0 mm LYMPH NODES (suspicious criteria: round shape, irregular borders, heterogeneous signal intensity): Mesorectal/Superior Rectal Lymph Nodes: N0: No visible lymph nodes/deposits Locoregional (Internal Iliac, Obturator, Inguinal below dentate line): N/A Distant (Common Iliac, External Iliac, Inguinal, Retroperitoneal): Mildly irregular left external iliac node measuring 1.0 x 0.7 cm (4/17.) She completed her antibiotics.  07/03/2024 accompanied by her daughter; Reports nausea started on 3rd day post treatment and had vomited 3 times that lasted for about 1 week. She was in ER for low grade fever and LBP, tramadol by Dr. Coronel did not work. She was d/c on Augmentin for 10 day. She feels much better now.  The patient's daughter reported the patient does not have adequate oral hydration at home.  This is the time when she felt nauseous.  07/24/2024 accompanied by her daughter Carmela and her son; Reports feeling much better after IV hydration with Zofran and Pepcid IV last week. She has no more diarrhea and/or vomiting, still with some occasional nausea.  08/21/2024 accompanied by her son; Reports feeling well at the baseline of her health status, no changes in chronic conditions or new complaints since the last visit.  09/25/2024 She is here today with her son to continue the treatment.  She mentioned that she has a very difficult situation where she may lose her insurance and will not be able to continue her cancer care.  Social work team was involved, but unfortunately unable to resolve the issue.  She will continue to work toward not losing her insurance.  She states that she feels much better after iron infusions.  10/30/2024 She is here accompanied by her daughter and her son. She is back to work on a light work schedule. She is happy to be back to work. She is feeling fine.  12/12/2024 She is here for F/U accompanied by her daughter and son. She is back to work. Still with her now chronic issues. Ready to start Chemo/RT, but not scheduled yet.

## 2024-12-16 NOTE — REVIEW OF SYSTEMS
[Fatigue] : fatigue [Negative] : Allergic/Immunologic [FreeTextEntry7] : rectal bleeding - resolved.

## 2024-12-16 NOTE — PHYSICAL EXAM
[Restricted in physically strenuous activity but ambulatory and able to carry out work of a light or sedentary nature] : Status 1- Restricted in physically strenuous activity but ambulatory and able to carry out work of a light or sedentary nature, e.g., light house work, office work [Normal] : affect appropriate [de-identified] : ostomy - looks well.

## 2024-12-16 NOTE — ASSESSMENT
[Curative] : Goals of care discussed with patient: Curative [FreeTextEntry1] : # Perforated rectal cancer, adenocarcinoma. - MMRp. - 04/08/2024 s/p sigmoid loop colostomy, was on IV antibiotics. - no metastasis on CT C/A/P - Rectal MRI for staging showed bulky disease 8.4 cm, T3C N0 - start FOLFOX today but unfortunately port was not placed and is pending on Friday, she is feeling well. Plan was to add irinotecan in the future, but she is very hesitant about FOLFIRINOX and we had an extensive discussion I explained the possible benefits of additional irinotecan but decided to proceed with just CapeOx for 3 to 4 months of treatment will aim for 6 cycles followed by long course chemoradiation. - We spoke about side effects of oxaliplatin before we spoke with capecitabine today which included rash as well as diarrhea rarely DPD deficiency severe mucositis and she is aware of approximately 1% chance of death - 05/06/2024 MR pelvis/rectal/anal - Status post diverting sigmoid loop colostomy. Small fat-containing paraumbilical hernia. A 3.2 x 3.0 x 2.3 cm mixed cystic and solid lesion within the central portion of the uterus possibly reflecting a submucosal fibroid or adenomyoma protruding into the endometrial cavity versus a true endometrial lesion. IMPRESSION: 1. Mid to high rectal tumor measuring 8.4 cm straddles the anterior peritoneal reflection, with evidence of EMVI; tumor extends beyond muscularis propria along the retrocervical region and contacts but does not definitely invade the posterior cervix. T category: T3c; 2. Mildly irregular 1.0 cm left external iliac lymph node. 3. A 3.2 cm mixed cystic and solid lesion within the central portion of the uterus was likely present dating back to August 2019, possibly reflecting a submucosal fibroid or adenomyoma protruding into the endometrial cavity; GYN consultation may be of use. - 06/11/2024 started CapeOx as Oxaliplatin and capecitabine 14/21D Q21D. - 06/16/2024 CT A/P - Unchanged oncologic findings as above with large perirectal mass and abscess now contains a small focus of air. Stable resulting right hydronephrosis from obstructing the distal ureter. No emergent findings identified. Refer to prior report from 5/21/2024 for further details. - 07/03/2024 capecitabine dose reduction to 1000 mg PO BID 14/21D due to nausea and vomiting that resolved post IV hydration with Zofran and Pepcid IV. - 09/23/2024 MR Rectal - Since the prior examination on 5/6/2024, the primary tumor shows: Partial treatment response (status post 4 of 6 cycles of chemotherapy). No new lesions. Interval decrease in size of the known mid to high rectal tumor now measuring 6.0 cm in craniocaudal dimension, previously 8.4 cm. There remains gross invasion of the perirectal fat contacting the mesorectal fascia posteriorly (8/23) and the anterior peritoneal reflection (8/13). No lymphadenopathy. - 10/30/2024 completed 6 cycles of CapeOx as Oxaliplatin 130 mg/m2 with 20% dose reduction = 104 mg/m2 = 145 mg IV and capecitabine 1000 mg BID 14/21D. - 11/22/2024 CT C/A/P - Since abdominal CT scan of June 16, 2024, Left lower quadrant colostomy device. No bowel obstruction or ascites. No significant interval change in large complex rectal/perirectal pelvic mass. No suspicious hepatic or intrathoracic lesions.  12/12/2024 Labs and CT reviewed and results discussed with the patient and her daughter and son; She was seeing by Dr. Micha Chavez earlier today to start ChemoRT with capecitabine; but also consider systemic therapy during ChemoRT planning phase and this will be discussed with Dr. Hurtado; as Dr. Muse is planning PET/CT for restaging and if no metastases, will proceed with ChempRT will last approximately 5.5 weeks - remains clinically stable to continue current management. All questions were answered to satisfaction.  PLAN: - continue Tramadol 100 mg PO Q8H PRN for pain - ordered. - proceed with PET next week as per Scott. - pending initiation ChemoRT VS systemic therapy during ChemoRT planning phase. - Labs today: CBC CMP Mg RTC in 3 weeks with CBC CMP Mg Carmela (daughter) [phone]

## 2024-12-16 NOTE — ASSESSMENT
[Curative] : Goals of care discussed with patient: Curative [FreeTextEntry1] : # Perforated rectal cancer, adenocarcinoma. - MMRp. - 04/08/2024 s/p sigmoid loop colostomy, was on IV antibiotics. - no metastasis on CT C/A/P - Rectal MRI for staging showed bulky disease 8.4 cm, T3C N0 - start FOLFOX today but unfortunately port was not placed and is pending on Friday, she is feeling well. Plan was to add irinotecan in the future, but she is very hesitant about FOLFIRINOX and we had an extensive discussion I explained the possible benefits of additional irinotecan but decided to proceed with just CapeOx for 3 to 4 months of treatment will aim for 6 cycles followed by long course chemoradiation. - We spoke about side effects of oxaliplatin before we spoke with capecitabine today which included rash as well as diarrhea rarely DPD deficiency severe mucositis and she is aware of approximately 1% chance of death - 05/06/2024 MR pelvis/rectal/anal - Status post diverting sigmoid loop colostomy. Small fat-containing paraumbilical hernia. A 3.2 x 3.0 x 2.3 cm mixed cystic and solid lesion within the central portion of the uterus possibly reflecting a submucosal fibroid or adenomyoma protruding into the endometrial cavity versus a true endometrial lesion. IMPRESSION: 1. Mid to high rectal tumor measuring 8.4 cm straddles the anterior peritoneal reflection, with evidence of EMVI; tumor extends beyond muscularis propria along the retrocervical region and contacts but does not definitely invade the posterior cervix. T category: T3c; 2. Mildly irregular 1.0 cm left external iliac lymph node. 3. A 3.2 cm mixed cystic and solid lesion within the central portion of the uterus was likely present dating back to August 2019, possibly reflecting a submucosal fibroid or adenomyoma protruding into the endometrial cavity; GYN consultation may be of use. - 06/11/2024 started CapeOx as Oxaliplatin and capecitabine 14/21D Q21D. - 06/16/2024 CT A/P - Unchanged oncologic findings as above with large perirectal mass and abscess now contains a small focus of air. Stable resulting right hydronephrosis from obstructing the distal ureter. No emergent findings identified. Refer to prior report from 5/21/2024 for further details. - 07/03/2024 capecitabine dose reduction to 1000 mg PO BID 14/21D due to nausea and vomiting that resolved post IV hydration with Zofran and Pepcid IV. - 09/23/2024 MR Rectal - Since the prior examination on 5/6/2024, the primary tumor shows: Partial treatment response (status post 4 of 6 cycles of chemotherapy). No new lesions. Interval decrease in size of the known mid to high rectal tumor now measuring 6.0 cm in craniocaudal dimension, previously 8.4 cm. There remains gross invasion of the perirectal fat contacting the mesorectal fascia posteriorly (8/23) and the anterior peritoneal reflection (8/13). No lymphadenopathy. - 10/30/2024 completed 6 cycles of CapeOx as Oxaliplatin 130 mg/m2 with 20% dose reduction = 104 mg/m2 = 145 mg IV and capecitabine 1000 mg BID 14/21D. - 11/22/2024 CT C/A/P - Since abdominal CT scan of June 16, 2024, Left lower quadrant colostomy device. No bowel obstruction or ascites. No significant interval change in large complex rectal/perirectal pelvic mass. No suspicious hepatic or intrathoracic lesions.  12/12/2024 Labs and CT reviewed and results discussed with the patient and her daughter and son; She was seeing by Dr. Micha Chavez earlier today to start ChemoRT with capecitabine; but also consider systemic therapy during ChemoRT planning phase and this will be discussed with Dr. Hurtado; as Dr. Muse is planning PET/CT for restaging and if no metastases, will proceed with ChempRT will last approximately 5.5 weeks - remains clinically stable to continue current management. All questions were answered to satisfaction.  PLAN: - continue Tramadol 100 mg PO Q8H PRN for pain - ordered. - proceed with PET next week as per Scott. - pending initiation ChemoRT VS systemic therapy during ChemoRT planning phase. - Labs today: CBC CMP Mg RTC in 3 weeks with CBC CMP Mg Carmela (daughter) [phone]

## 2024-12-16 NOTE — HISTORY OF PRESENT ILLNESS
[Disease: _____________________] : Disease: [unfilled] [T: ___] : T[unfilled] [N: ___] : N[unfilled] [M: ___] : M[unfilled] [AJCC Stage: ____] : AJCC Stage: [unfilled] [de-identified] : 56 y/o female w/ no significant PMHx who presented to [facility] on 3/30/24 complaining of rectal pain associated w/ BRBPR for past several weeks. Workup included a CT scan of the abdomen which demonstrated a low rectal 9cm long segment of mass-like thickening, suspicious for rectal cancer, associated w/ abscess and possible local perforation. Patient was transferred to [facility] for further evaluation and management.  Patient underwent IR drainage of x2 perirectal abscess (10cc purulent fluid drained from each) on 4/2. Abscess cultures were positive for ESBL E.coli. She completed 7 days of Zosyn prior to culture results. Antibiotics were changed to 1g q8h beginning on 4/9.  She is now s/p sigmoid loop colostomy creation with flexible sigmoidoscopy, and biopsies on 4/8/24 with Dr. Coronel. Intra-operatively, patient was noted to have a large necrotizing mass 6cm from anal verge. 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  On 4/12/24 she was cleared for discharge. A midline catheter was placed in the hospital. She will complete IV antibiotics (Ertapenem 1g QD) for 10 days from 4/12 to 4/22  She has been having CRP done as outpatient and they are coming down but still elevated and she was instructed to remain on IV antibiotics for another week.  CT C/A/P did not show any distant metastasis: 3/30 and 3/31/2024: No evidence of acute intrathoracic pathology  Nonspecific lobulated cystic structure within the uterine dome. This may reflect an atypical fibroid. Nonemergent pelvic ultrasound or MRI may be of benefit.  Most recent blood work from 4/24/2024 white blood cell count 6.4 hemoglobin 8.9 platelets 500,000 CMP normal LFTs albumin is 3.2 creatinine 0.7 rest of CMP is normal ferritin was 150 on 411 iron saturation 6%.  Most recent CRP 29 from 430 was 76 on 417  CA 19-9 11 CA 19-9 116 on 3/31/2024  She is under the care of Dr. Coronel. She was seen by inpatient onc team. She as presented in Tumor board. Plan is to start AMIRA with chemotherapy. [de-identified] : adenocarcinoma MMR proficent  [de-identified] : 5/15/2024 She is here for follow up. She had MR rectum:  TUMOR LOCATION: Mid to high rectal tumor Tumor location descriptor: Posterior Distance to anal verge (lowest extent of tumor): 6.2 cm Distance to top of anal sphincter complex (lowest extent of tumor): 3.1 cm Relationship to anterior peritoneal reflection: Straddles. TUMOR CHARACTERISTICS: Craniocaudal length: 8.4 cm Morphology: Polypoid Mucinous: Some mucin. T CATEGORY: T3c (tumor penetrates >5-15 mm beyond muscularis propria); tumor extends approximately 11 mm beyond the muscularis propria along the retrocervical region and contacts but does not definitely invade the posterior cervix (3/8.) EXTRAMURAL VASCULAR INVASION (EMVI): Present Location of EMVI (indicate series and image number): In the presacral region, along the posterior aspect of the tumor (3/15) Mesorectal Fascial (MRF) (FOR T3 ONLY): Distance between tumor and MRF: 0 mm LYMPH NODES (suspicious criteria: round shape, irregular borders, heterogeneous signal intensity): Mesorectal/Superior Rectal Lymph Nodes: N0: No visible lymph nodes/deposits Locoregional (Internal Iliac, Obturator, Inguinal below dentate line): N/A Distant (Common Iliac, External Iliac, Inguinal, Retroperitoneal): Mildly irregular left external iliac node measuring 1.0 x 0.7 cm (4/17.) She completed her antibiotics.  07/03/2024 accompanied by her daughter; Reports nausea started on 3rd day post treatment and had vomited 3 times that lasted for about 1 week. She was in ER for low grade fever and LBP, tramadol by Dr. Coronel did not work. She was d/c on Augmentin for 10 day. She feels much better now.  The patient's daughter reported the patient does not have adequate oral hydration at home.  This is the time when she felt nauseous.  07/24/2024 accompanied by her daughter Carmela and her son; Reports feeling much better after IV hydration with Zofran and Pepcid IV last week. She has no more diarrhea and/or vomiting, still with some occasional nausea.  08/21/2024 accompanied by her son; Reports feeling well at the baseline of her health status, no changes in chronic conditions or new complaints since the last visit.  09/25/2024 She is here today with her son to continue the treatment.  She mentioned that she has a very difficult situation where she may lose her insurance and will not be able to continue her cancer care.  Social work team was involved, but unfortunately unable to resolve the issue.  She will continue to work toward not losing her insurance.  She states that she feels much better after iron infusions.  10/30/2024 She is here accompanied by her daughter and her son. She is back to work on a light work schedule. She is happy to be back to work. She is feeling fine.  12/12/2024 She is here for F/U accompanied by her daughter and son. She is back to work. Still with her now chronic issues. Ready to start Chemo/RT, but not scheduled yet.

## 2024-12-16 NOTE — ASSESSMENT
[Curative] : Goals of care discussed with patient: Curative [FreeTextEntry1] : # Perforated rectal cancer, adenocarcinoma. - MMRp. - 04/08/2024 s/p sigmoid loop colostomy, was on IV antibiotics. - no metastasis on CT C/A/P - Rectal MRI for staging showed bulky disease 8.4 cm, T3C N0 - start FOLFOX today but unfortunately port was not placed and is pending on Friday, she is feeling well. Plan was to add irinotecan in the future, but she is very hesitant about FOLFIRINOX and we had an extensive discussion I explained the possible benefits of additional irinotecan but decided to proceed with just CapeOx for 3 to 4 months of treatment will aim for 6 cycles followed by long course chemoradiation. - We spoke about side effects of oxaliplatin before we spoke with capecitabine today which included rash as well as diarrhea rarely DPD deficiency severe mucositis and she is aware of approximately 1% chance of death - 05/06/2024 MR pelvis/rectal/anal - Status post diverting sigmoid loop colostomy. Small fat-containing paraumbilical hernia. A 3.2 x 3.0 x 2.3 cm mixed cystic and solid lesion within the central portion of the uterus possibly reflecting a submucosal fibroid or adenomyoma protruding into the endometrial cavity versus a true endometrial lesion. IMPRESSION: 1. Mid to high rectal tumor measuring 8.4 cm straddles the anterior peritoneal reflection, with evidence of EMVI; tumor extends beyond muscularis propria along the retrocervical region and contacts but does not definitely invade the posterior cervix. T category: T3c; 2. Mildly irregular 1.0 cm left external iliac lymph node. 3. A 3.2 cm mixed cystic and solid lesion within the central portion of the uterus was likely present dating back to August 2019, possibly reflecting a submucosal fibroid or adenomyoma protruding into the endometrial cavity; GYN consultation may be of use. - 06/11/2024 started CapeOx as Oxaliplatin and capecitabine 14/21D Q21D. - 06/16/2024 CT A/P - Unchanged oncologic findings as above with large perirectal mass and abscess now contains a small focus of air. Stable resulting right hydronephrosis from obstructing the distal ureter. No emergent findings identified. Refer to prior report from 5/21/2024 for further details. - 07/03/2024 capecitabine dose reduction to 1000 mg PO BID 14/21D due to nausea and vomiting that resolved post IV hydration with Zofran and Pepcid IV. - 09/23/2024 MR Rectal - Since the prior examination on 5/6/2024, the primary tumor shows: Partial treatment response (status post 4 of 6 cycles of chemotherapy). No new lesions. Interval decrease in size of the known mid to high rectal tumor now measuring 6.0 cm in craniocaudal dimension, previously 8.4 cm. There remains gross invasion of the perirectal fat contacting the mesorectal fascia posteriorly (8/23) and the anterior peritoneal reflection (8/13). No lymphadenopathy. - 10/30/2024 completed 6 cycles of CapeOx as Oxaliplatin 130 mg/m2 with 20% dose reduction = 104 mg/m2 = 145 mg IV and capecitabine 1000 mg BID 14/21D. - 11/22/2024 CT C/A/P - Since abdominal CT scan of June 16, 2024, Left lower quadrant colostomy device. No bowel obstruction or ascites. No significant interval change in large complex rectal/perirectal pelvic mass. No suspicious hepatic or intrathoracic lesions.  12/12/2024 Labs and CT reviewed and results discussed with the patient and her daughter and son; She was seeing by Dr. Micha Chavez earlier today to start ChemoRT with capecitabine; but also consider systemic therapy during ChemoRT planning phase and this will be discussed with Dr. Hurtado; as Dr. Muse is planning PET/CT for restaging and if no metastases, will proceed with ChempRT will last approximately 5.5 weeks - remains clinically stable to continue current management. All questions were answered to satisfaction.  PLAN: - continue Tramadol 100 mg PO Q8H PRN for pain - ordered. - proceed with PET next week as per Scott. - pending initiation ChemoRT VS systemic therapy during ChemoRT planning phase. - Labs today: CBC CMP Mg RTC in 3 weeks with CBC CMP Mg Carmela (daughter) [phone]

## 2024-12-16 NOTE — PHYSICAL EXAM
[Restricted in physically strenuous activity but ambulatory and able to carry out work of a light or sedentary nature] : Status 1- Restricted in physically strenuous activity but ambulatory and able to carry out work of a light or sedentary nature, e.g., light house work, office work [Normal] : affect appropriate [de-identified] : ostomy - looks well.

## 2024-12-16 NOTE — HISTORY OF PRESENT ILLNESS
[Disease: _____________________] : Disease: [unfilled] [T: ___] : T[unfilled] [N: ___] : N[unfilled] [M: ___] : M[unfilled] [AJCC Stage: ____] : AJCC Stage: [unfilled] [de-identified] : 56 y/o female w/ no significant PMHx who presented to [facility] on 3/30/24 complaining of rectal pain associated w/ BRBPR for past several weeks. Workup included a CT scan of the abdomen which demonstrated a low rectal 9cm long segment of mass-like thickening, suspicious for rectal cancer, associated w/ abscess and possible local perforation. Patient was transferred to [facility] for further evaluation and management.  Patient underwent IR drainage of x2 perirectal abscess (10cc purulent fluid drained from each) on 4/2. Abscess cultures were positive for ESBL E.coli. She completed 7 days of Zosyn prior to culture results. Antibiotics were changed to 1g q8h beginning on 4/9.  She is now s/p sigmoid loop colostomy creation with flexible sigmoidoscopy, and biopsies on 4/8/24 with Dr. Coronel. Intra-operatively, patient was noted to have a large necrotizing mass 6cm from anal verge. 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  On 4/12/24 she was cleared for discharge. A midline catheter was placed in the hospital. She will complete IV antibiotics (Ertapenem 1g QD) for 10 days from 4/12 to 4/22  She has been having CRP done as outpatient and they are coming down but still elevated and she was instructed to remain on IV antibiotics for another week.  CT C/A/P did not show any distant metastasis: 3/30 and 3/31/2024: No evidence of acute intrathoracic pathology  Nonspecific lobulated cystic structure within the uterine dome. This may reflect an atypical fibroid. Nonemergent pelvic ultrasound or MRI may be of benefit.  Most recent blood work from 4/24/2024 white blood cell count 6.4 hemoglobin 8.9 platelets 500,000 CMP normal LFTs albumin is 3.2 creatinine 0.7 rest of CMP is normal ferritin was 150 on 411 iron saturation 6%.  Most recent CRP 29 from 430 was 76 on 417  CA 19-9 11 CA 19-9 116 on 3/31/2024  She is under the care of Dr. Coronel. She was seen by inpatient onc team. She as presented in Tumor board. Plan is to start AMIRA with chemotherapy. [de-identified] : adenocarcinoma MMR proficent  [de-identified] : 5/15/2024 She is here for follow up. She had MR rectum:  TUMOR LOCATION: Mid to high rectal tumor Tumor location descriptor: Posterior Distance to anal verge (lowest extent of tumor): 6.2 cm Distance to top of anal sphincter complex (lowest extent of tumor): 3.1 cm Relationship to anterior peritoneal reflection: Straddles. TUMOR CHARACTERISTICS: Craniocaudal length: 8.4 cm Morphology: Polypoid Mucinous: Some mucin. T CATEGORY: T3c (tumor penetrates >5-15 mm beyond muscularis propria); tumor extends approximately 11 mm beyond the muscularis propria along the retrocervical region and contacts but does not definitely invade the posterior cervix (3/8.) EXTRAMURAL VASCULAR INVASION (EMVI): Present Location of EMVI (indicate series and image number): In the presacral region, along the posterior aspect of the tumor (3/15) Mesorectal Fascial (MRF) (FOR T3 ONLY): Distance between tumor and MRF: 0 mm LYMPH NODES (suspicious criteria: round shape, irregular borders, heterogeneous signal intensity): Mesorectal/Superior Rectal Lymph Nodes: N0: No visible lymph nodes/deposits Locoregional (Internal Iliac, Obturator, Inguinal below dentate line): N/A Distant (Common Iliac, External Iliac, Inguinal, Retroperitoneal): Mildly irregular left external iliac node measuring 1.0 x 0.7 cm (4/17.) She completed her antibiotics.  07/03/2024 accompanied by her daughter; Reports nausea started on 3rd day post treatment and had vomited 3 times that lasted for about 1 week. She was in ER for low grade fever and LBP, tramadol by Dr. Coronel did not work. She was d/c on Augmentin for 10 day. She feels much better now.  The patient's daughter reported the patient does not have adequate oral hydration at home.  This is the time when she felt nauseous.  07/24/2024 accompanied by her daughter Carmela and her son; Reports feeling much better after IV hydration with Zofran and Pepcid IV last week. She has no more diarrhea and/or vomiting, still with some occasional nausea.  08/21/2024 accompanied by her son; Reports feeling well at the baseline of her health status, no changes in chronic conditions or new complaints since the last visit.  09/25/2024 She is here today with her son to continue the treatment.  She mentioned that she has a very difficult situation where she may lose her insurance and will not be able to continue her cancer care.  Social work team was involved, but unfortunately unable to resolve the issue.  She will continue to work toward not losing her insurance.  She states that she feels much better after iron infusions.  10/30/2024 She is here accompanied by her daughter and her son. She is back to work on a light work schedule. She is happy to be back to work. She is feeling fine.  12/12/2024 She is here for F/U accompanied by her daughter and son. She is back to work. Still with her now chronic issues. Ready to start Chemo/RT, but not scheduled yet.

## 2024-12-16 NOTE — PHYSICAL EXAM
[Restricted in physically strenuous activity but ambulatory and able to carry out work of a light or sedentary nature] : Status 1- Restricted in physically strenuous activity but ambulatory and able to carry out work of a light or sedentary nature, e.g., light house work, office work [Normal] : affect appropriate [de-identified] : ostomy - looks well.

## 2025-01-03 NOTE — HISTORY OF PRESENT ILLNESS
[FreeTextEntry1] : 57 year old, para 3-0-0-3, with history invasive adenocarcinoma of the rectum, s/p sigmoid loop colostomy by Dr. Coronel on 04/08/24.  She has completed cycle 6 of 6 chemo, treated by Dr. Hurtado. MRI 05/06/24 noted A 3.2 cm mixed cystic and solid lesion within the central portion of the uterus was likely present dating back to August 2019, possibly reflecting a submucosal fibroid or adenomyoma protruding into the endometrial cavity. She complains of pain in vaginal area.  MR of the pelvis was performed as per Rectal Cancer Protocol 09/23/2024  FINDINGS: Treated Primary Tumor Characteristics: MRI-T2W: Intermediate signal intensity, no dark T2/scar Interval decrease in size of the known mid to high rectal tumor now measuring 6.0 cm in craniocaudal dimension, previously 8.4 cm. Restricted DWI (with associated low ADC) in Tumor/Tumor Bed: Present There remains gross invasion of the perirectal fat contacting the mesorectal fascia posteriorly (8/23) and the anterior peritoneal reflection (8/13). Distance of lowest extent of treated tumor from anal verge: 5.0 cm Relationship to anterior peritoneal reflection: At or straddles  EXTRAMURAL VASCULAR INVASION (EMVI): No, complete regression  LYMPH NODES: No suspicious lymph nodes.  ADDITIONAL COMMENTS: Status post diverting sigmoid loop colostomy. Stable 3.2 x 3.0 x 2.3 cm mixed cystic and solid lesion within the central portion of the uterus possibly reflecting a submucosal fibroid or adenomyoma protruding into the endometrial cavity versus a true endometrial lesion.  IMPRESSION: Since the prior examination on 5/6/2024, the primary tumor shows:  Partial treatment response (status post 4 of 6 cycles of chemotherapy)  No new lesions  Interval decrease in size of the known mid to high rectal tumor now measuring 6.0 cm in craniocaudal dimension, previously 8.4 cm.  There remains gross invasion of the perirectal fat contacting the mesorectal fascia posteriorly (8/23) and the anterior peritoneal reflection (8/13).  No lymphadenopathy.  Patient presents today for initial office evaluation.

## 2025-01-03 NOTE — PLAN
[TextEntry] : refer to Dr. MITTAL. The patient will likely need additional treatment prior to any surgical intervention as the rectal mass is bulky and difficult to clear. In addition a reassuring PET SCAN confirming localized disease will be required prior to any surgical intervention.

## 2025-01-03 NOTE — PHYSICAL EXAM
[07262] : A chaperone was present during the pelvic exam. [Chaperone Present] : A chaperone was present in the examining room during all aspects of the physical examination [FreeTextEntry2] : Dayami Ayon [TextEntry] : cachectic female poorly nourished in moderate distress HEENT - wnl Breasts - symmetrical w/o masses or nipple discharge Abdomen - soft, non-tender, LLQ colostomy in Place. Back - no CVA tenderness or spinal tenderness Extremities - w/o clubbing, cyanosis, no lesions noted Neuro - AAOx3  Pelvic Exam External Genitalia - with out lesions Vagina - mucosa atrophic, Pain on exam from rectal mass compressing pelvis. Cervix - no lesions Uterus -  6 wk size, nontender, mobile Adnexa - no palpable masses or tenderness b/l Rectovaginal - confirmatory

## 2025-01-03 NOTE — ASSESSMENT
[FreeTextEntry1] : 57 year old, para 3-0-0-3, with history invasive adenocarcinoma of the rectum, s/p sigmoid loop colostomy by Dr. Coronel on 04/08/24. She has completed cycle 6 of 6 chemo, treated by Dr. Hurtado. MRI 05/06/24 noted A 3.2 cm mixed cystic and solid lesion within the central portion of the uterus was likely present dating back to August 2019, possibly reflecting a submucosal fibroid or adenomyoma protruding into the endometrial cavity. She complains of pain in vaginal area. Her pain is secondary to compression from her rectal mass. She is inquiring about hysterectomy at the time of Rectal surgery. The Plan is to complete a PET scan and re-evaluate the uterus. If disease is localized then she may receive RT and then be reevaluated for resection. Otherwise continued palliative chemo will be required. Dr. Rizzo works with Dr. MITTAL and hence the Patient is being referred to Dr. MITTAL.

## 2025-01-03 NOTE — PHYSICAL EXAM
[69525] : A chaperone was present during the pelvic exam. [Chaperone Present] : A chaperone was present in the examining room during all aspects of the physical examination [FreeTextEntry2] : Dayami Ayon [TextEntry] : cachectic female poorly nourished in moderate distress HEENT - wnl Breasts - symmetrical w/o masses or nipple discharge Abdomen - soft, non-tender, LLQ colostomy in Place. Back - no CVA tenderness or spinal tenderness Extremities - w/o clubbing, cyanosis, no lesions noted Neuro - AAOx3  Pelvic Exam External Genitalia - with out lesions Vagina - mucosa atrophic, Pain on exam from rectal mass compressing pelvis. Cervix - no lesions Uterus -  6 wk size, nontender, mobile Adnexa - no palpable masses or tenderness b/l Rectovaginal - confirmatory

## 2025-01-18 NOTE — HISTORY OF PRESENT ILLNESS
[Disease: _____________________] : Disease: [unfilled] [T: ___] : T[unfilled] [N: ___] : N[unfilled] [M: ___] : M[unfilled] [AJCC Stage: ____] : AJCC Stage: [unfilled] [de-identified] : 58 y/o female w/ no significant PMHx who presented to [facility] on 3/30/24 complaining of rectal pain associated w/ BRBPR for past several weeks. Workup included a CT scan of the abdomen which demonstrated a low rectal 9cm long segment of mass-like thickening, suspicious for rectal cancer, associated w/ abscess and possible local perforation. Patient was transferred to [facility] for further evaluation and management.  Patient underwent IR drainage of x2 perirectal abscess (10cc purulent fluid drained from each) on 4/2. Abscess cultures were positive for ESBL E.coli. She completed 7 days of Zosyn prior to culture results. Antibiotics were changed to 1g q8h beginning on 4/9.  She is now s/p sigmoid loop colostomy creation with flexible sigmoidoscopy, and biopsies on 4/8/24 with Dr. Coronel. Intra-operatively, patient was noted to have a large necrotizing mass 6cm from anal verge. 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  On 4/12/24 she was cleared for discharge. A midline catheter was placed in the hospital. She will complete IV antibiotics (Ertapenem 1g QD) for 10 days from 4/12 to 4/22  She has been having CRP done as outpatient and they are coming down but still elevated and she was instructed to remain on IV antibiotics for another week.  CT C/A/P did not show any distant metastasis: 3/30 and 3/31/2024: No evidence of acute intrathoracic pathology  Nonspecific lobulated cystic structure within the uterine dome. This may reflect an atypical fibroid. Nonemergent pelvic ultrasound or MRI may be of benefit.  Most recent blood work from 4/24/2024 white blood cell count 6.4 hemoglobin 8.9 platelets 500,000 CMP normal LFTs albumin is 3.2 creatinine 0.7 rest of CMP is normal ferritin was 150 on 411 iron saturation 6%.  Most recent CRP 29 from 430 was 76 on 417  CA 19-9 11 CA 19-9 116 on 3/31/2024  She is under the care of Dr. Coronel. She was seen by inpatient onc team. She as presented in Tumor board. Plan is to start AMIRA with chemotherapy. [de-identified] : adenocarcinoma MMR proficent  [de-identified] : 5/15/2024 She is here for follow up. She had MR rectum:  TUMOR LOCATION: Mid to high rectal tumor Tumor location descriptor: Posterior Distance to anal verge (lowest extent of tumor): 6.2 cm Distance to top of anal sphincter complex (lowest extent of tumor): 3.1 cm Relationship to anterior peritoneal reflection: Straddles. TUMOR CHARACTERISTICS: Craniocaudal length: 8.4 cm Morphology: Polypoid Mucinous: Some mucin. T CATEGORY: T3c (tumor penetrates >5-15 mm beyond muscularis propria); tumor extends approximately 11 mm beyond the muscularis propria along the retrocervical region and contacts but does not definitely invade the posterior cervix (3/8.) EXTRAMURAL VASCULAR INVASION (EMVI): Present Location of EMVI (indicate series and image number): In the presacral region, along the posterior aspect of the tumor (3/15) Mesorectal Fascial (MRF) (FOR T3 ONLY): Distance between tumor and MRF: 0 mm LYMPH NODES (suspicious criteria: round shape, irregular borders, heterogeneous signal intensity): Mesorectal/Superior Rectal Lymph Nodes: N0: No visible lymph nodes/deposits Locoregional (Internal Iliac, Obturator, Inguinal below dentate line): N/A Distant (Common Iliac, External Iliac, Inguinal, Retroperitoneal): Mildly irregular left external iliac node measuring 1.0 x 0.7 cm (4/17.) She completed her antibiotics.  07/03/2024 accompanied by her daughter; Reports nausea started on 3rd day post treatment and had vomited 3 times that lasted for about 1 week. She was in ER for low grade fever and LBP, tramadol by Dr. Coronel did not work. She was d/c on Augmentin for 10 day. She feels much better now.  The patient's daughter reported the patient does not have adequate oral hydration at home.  This is the time when she felt nauseous.  07/24/2024 accompanied by her daughter Carmela and her son; Reports feeling much better after IV hydration with Zofran and Pepcid IV last week. She has no more diarrhea and/or vomiting, still with some occasional nausea.  08/21/2024 accompanied by her son; Reports feeling well at the baseline of her health status, no changes in chronic conditions or new complaints since the last visit.  09/25/2024 She is here today with her son to continue the treatment.  She mentioned that she has a very difficult situation where she may lose her insurance and will not be able to continue her cancer care.  Social work team was involved, but unfortunately unable to resolve the issue.  She will continue to work toward not losing her insurance.  She states that she feels much better after iron infusions.  10/30/2024 She is here accompanied by her daughter and her son. She is back to work on a light work schedule. She is happy to be back to work. She is feeling fine.  12/12/2024 She is here for F/U accompanied by her daughter and son. She is back to work. Still with her now chronic issues. Ready to start Chemo/RT, but not scheduled yet.  01/15/2025 accompanied by her  and daughter; Reports her pain is not getting better

## 2025-01-18 NOTE — HISTORY OF PRESENT ILLNESS
[Disease: _____________________] : Disease: [unfilled] [T: ___] : T[unfilled] [N: ___] : N[unfilled] [M: ___] : M[unfilled] [AJCC Stage: ____] : AJCC Stage: [unfilled] [de-identified] : 56 y/o female w/ no significant PMHx who presented to [facility] on 3/30/24 complaining of rectal pain associated w/ BRBPR for past several weeks. Workup included a CT scan of the abdomen which demonstrated a low rectal 9cm long segment of mass-like thickening, suspicious for rectal cancer, associated w/ abscess and possible local perforation. Patient was transferred to [facility] for further evaluation and management.  Patient underwent IR drainage of x2 perirectal abscess (10cc purulent fluid drained from each) on 4/2. Abscess cultures were positive for ESBL E.coli. She completed 7 days of Zosyn prior to culture results. Antibiotics were changed to 1g q8h beginning on 4/9.  She is now s/p sigmoid loop colostomy creation with flexible sigmoidoscopy, and biopsies on 4/8/24 with Dr. Coronel. Intra-operatively, patient was noted to have a large necrotizing mass 6cm from anal verge. 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  On 4/12/24 she was cleared for discharge. A midline catheter was placed in the hospital. She will complete IV antibiotics (Ertapenem 1g QD) for 10 days from 4/12 to 4/22  She has been having CRP done as outpatient and they are coming down but still elevated and she was instructed to remain on IV antibiotics for another week.  CT C/A/P did not show any distant metastasis: 3/30 and 3/31/2024: No evidence of acute intrathoracic pathology  Nonspecific lobulated cystic structure within the uterine dome. This may reflect an atypical fibroid. Nonemergent pelvic ultrasound or MRI may be of benefit.  Most recent blood work from 4/24/2024 white blood cell count 6.4 hemoglobin 8.9 platelets 500,000 CMP normal LFTs albumin is 3.2 creatinine 0.7 rest of CMP is normal ferritin was 150 on 411 iron saturation 6%.  Most recent CRP 29 from 430 was 76 on 417  CA 19-9 11 CA 19-9 116 on 3/31/2024  She is under the care of Dr. Coronel. She was seen by inpatient onc team. She as presented in Tumor board. Plan is to start AMIRA with chemotherapy. [de-identified] : adenocarcinoma MMR proficent  [de-identified] : 5/15/2024 She is here for follow up. She had MR rectum:  TUMOR LOCATION: Mid to high rectal tumor Tumor location descriptor: Posterior Distance to anal verge (lowest extent of tumor): 6.2 cm Distance to top of anal sphincter complex (lowest extent of tumor): 3.1 cm Relationship to anterior peritoneal reflection: Straddles. TUMOR CHARACTERISTICS: Craniocaudal length: 8.4 cm Morphology: Polypoid Mucinous: Some mucin. T CATEGORY: T3c (tumor penetrates >5-15 mm beyond muscularis propria); tumor extends approximately 11 mm beyond the muscularis propria along the retrocervical region and contacts but does not definitely invade the posterior cervix (3/8.) EXTRAMURAL VASCULAR INVASION (EMVI): Present Location of EMVI (indicate series and image number): In the presacral region, along the posterior aspect of the tumor (3/15) Mesorectal Fascial (MRF) (FOR T3 ONLY): Distance between tumor and MRF: 0 mm LYMPH NODES (suspicious criteria: round shape, irregular borders, heterogeneous signal intensity): Mesorectal/Superior Rectal Lymph Nodes: N0: No visible lymph nodes/deposits Locoregional (Internal Iliac, Obturator, Inguinal below dentate line): N/A Distant (Common Iliac, External Iliac, Inguinal, Retroperitoneal): Mildly irregular left external iliac node measuring 1.0 x 0.7 cm (4/17.) She completed her antibiotics.  07/03/2024 accompanied by her daughter; Reports nausea started on 3rd day post treatment and had vomited 3 times that lasted for about 1 week. She was in ER for low grade fever and LBP, tramadol by Dr. Coronel did not work. She was d/c on Augmentin for 10 day. She feels much better now.  The patient's daughter reported the patient does not have adequate oral hydration at home.  This is the time when she felt nauseous.  07/24/2024 accompanied by her daughter Carmela and her son; Reports feeling much better after IV hydration with Zofran and Pepcid IV last week. She has no more diarrhea and/or vomiting, still with some occasional nausea.  08/21/2024 accompanied by her son; Reports feeling well at the baseline of her health status, no changes in chronic conditions or new complaints since the last visit.  09/25/2024 She is here today with her son to continue the treatment.  She mentioned that she has a very difficult situation where she may lose her insurance and will not be able to continue her cancer care.  Social work team was involved, but unfortunately unable to resolve the issue.  She will continue to work toward not losing her insurance.  She states that she feels much better after iron infusions.  10/30/2024 She is here accompanied by her daughter and her son. She is back to work on a light work schedule. She is happy to be back to work. She is feeling fine.  12/12/2024 She is here for F/U accompanied by her daughter and son. She is back to work. Still with her now chronic issues. Ready to start Chemo/RT, but not scheduled yet.  01/15/2025 accompanied by her  and daughter; Reports her pain is not getting better

## 2025-01-18 NOTE — HISTORY OF PRESENT ILLNESS
[Disease: _____________________] : Disease: [unfilled] [T: ___] : T[unfilled] [N: ___] : N[unfilled] [M: ___] : M[unfilled] [AJCC Stage: ____] : AJCC Stage: [unfilled] [de-identified] : 58 y/o female w/ no significant PMHx who presented to [facility] on 3/30/24 complaining of rectal pain associated w/ BRBPR for past several weeks. Workup included a CT scan of the abdomen which demonstrated a low rectal 9cm long segment of mass-like thickening, suspicious for rectal cancer, associated w/ abscess and possible local perforation. Patient was transferred to [facility] for further evaluation and management.  Patient underwent IR drainage of x2 perirectal abscess (10cc purulent fluid drained from each) on 4/2. Abscess cultures were positive for ESBL E.coli. She completed 7 days of Zosyn prior to culture results. Antibiotics were changed to 1g q8h beginning on 4/9.  She is now s/p sigmoid loop colostomy creation with flexible sigmoidoscopy, and biopsies on 4/8/24 with Dr. Coronel. Intra-operatively, patient was noted to have a large necrotizing mass 6cm from anal verge. 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  On 4/12/24 she was cleared for discharge. A midline catheter was placed in the hospital. She will complete IV antibiotics (Ertapenem 1g QD) for 10 days from 4/12 to 4/22  She has been having CRP done as outpatient and they are coming down but still elevated and she was instructed to remain on IV antibiotics for another week.  CT C/A/P did not show any distant metastasis: 3/30 and 3/31/2024: No evidence of acute intrathoracic pathology  Nonspecific lobulated cystic structure within the uterine dome. This may reflect an atypical fibroid. Nonemergent pelvic ultrasound or MRI may be of benefit.  Most recent blood work from 4/24/2024 white blood cell count 6.4 hemoglobin 8.9 platelets 500,000 CMP normal LFTs albumin is 3.2 creatinine 0.7 rest of CMP is normal ferritin was 150 on 411 iron saturation 6%.  Most recent CRP 29 from 430 was 76 on 417  CA 19-9 11 CA 19-9 116 on 3/31/2024  She is under the care of Dr. Coronel. She was seen by inpatient onc team. She as presented in Tumor board. Plan is to start AMIRA with chemotherapy. [de-identified] : adenocarcinoma MMR proficent  [de-identified] : 5/15/2024 She is here for follow up. She had MR rectum:  TUMOR LOCATION: Mid to high rectal tumor Tumor location descriptor: Posterior Distance to anal verge (lowest extent of tumor): 6.2 cm Distance to top of anal sphincter complex (lowest extent of tumor): 3.1 cm Relationship to anterior peritoneal reflection: Straddles. TUMOR CHARACTERISTICS: Craniocaudal length: 8.4 cm Morphology: Polypoid Mucinous: Some mucin. T CATEGORY: T3c (tumor penetrates >5-15 mm beyond muscularis propria); tumor extends approximately 11 mm beyond the muscularis propria along the retrocervical region and contacts but does not definitely invade the posterior cervix (3/8.) EXTRAMURAL VASCULAR INVASION (EMVI): Present Location of EMVI (indicate series and image number): In the presacral region, along the posterior aspect of the tumor (3/15) Mesorectal Fascial (MRF) (FOR T3 ONLY): Distance between tumor and MRF: 0 mm LYMPH NODES (suspicious criteria: round shape, irregular borders, heterogeneous signal intensity): Mesorectal/Superior Rectal Lymph Nodes: N0: No visible lymph nodes/deposits Locoregional (Internal Iliac, Obturator, Inguinal below dentate line): N/A Distant (Common Iliac, External Iliac, Inguinal, Retroperitoneal): Mildly irregular left external iliac node measuring 1.0 x 0.7 cm (4/17.) She completed her antibiotics.  07/03/2024 accompanied by her daughter; Reports nausea started on 3rd day post treatment and had vomited 3 times that lasted for about 1 week. She was in ER for low grade fever and LBP, tramadol by Dr. Coronel did not work. She was d/c on Augmentin for 10 day. She feels much better now.  The patient's daughter reported the patient does not have adequate oral hydration at home.  This is the time when she felt nauseous.  07/24/2024 accompanied by her daughter Carmela and her son; Reports feeling much better after IV hydration with Zofran and Pepcid IV last week. She has no more diarrhea and/or vomiting, still with some occasional nausea.  08/21/2024 accompanied by her son; Reports feeling well at the baseline of her health status, no changes in chronic conditions or new complaints since the last visit.  09/25/2024 She is here today with her son to continue the treatment.  She mentioned that she has a very difficult situation where she may lose her insurance and will not be able to continue her cancer care.  Social work team was involved, but unfortunately unable to resolve the issue.  She will continue to work toward not losing her insurance.  She states that she feels much better after iron infusions.  10/30/2024 She is here accompanied by her daughter and her son. She is back to work on a light work schedule. She is happy to be back to work. She is feeling fine.  12/12/2024 She is here for F/U accompanied by her daughter and son. She is back to work. Still with her now chronic issues. Ready to start Chemo/RT, but not scheduled yet.  01/15/2025 accompanied by her  and daughter; Reports her pain is not getting better

## 2025-01-18 NOTE — PHYSICAL EXAM
[Restricted in physically strenuous activity but ambulatory and able to carry out work of a light or sedentary nature] : Status 1- Restricted in physically strenuous activity but ambulatory and able to carry out work of a light or sedentary nature, e.g., light house work, office work [Normal] : affect appropriate [de-identified] : ostomy - looks well.

## 2025-01-18 NOTE — ASSESSMENT
[Curative] : Goals of care discussed with patient: Curative [Medication(s)] : Medication(s) [FreeTextEntry1] : # Perforated rectal cancer, adenocarcinoma. - MMRp. - 04/08/2024 s/p sigmoid loop colostomy, was on IV antibiotics. - no metastasis on CT C/A/P - Rectal MRI for staging showed bulky disease 8.4 cm, T3C N0 - start FOLFOX today but unfortunately port was not placed and is pending on Friday, she is feeling well. Plan was to add irinotecan in the future, but she is very hesitant about FOLFIRINOX and we had an extensive discussion I explained the possible benefits of additional irinotecan but decided to proceed with just CapeOx for 3 to 4 months of treatment will aim for 6 cycles followed by long course chemoradiation. - We spoke about side effects of oxaliplatin before we spoke with capecitabine today which included rash as well as diarrhea rarely DPD deficiency severe mucositis and she is aware of approximately 1% chance of death - 05/06/2024 MR pelvis/rectal/anal - Status post diverting sigmoid loop colostomy. Small fat-containing paraumbilical hernia. A 3.2 x 3.0 x 2.3 cm mixed cystic and solid lesion within the central portion of the uterus possibly reflecting a submucosal fibroid or adenomyoma protruding into the endometrial cavity versus a true endometrial lesion. IMPRESSION: 1. Mid to high rectal tumor measuring 8.4 cm straddles the anterior peritoneal reflection, with evidence of EMVI; tumor extends beyond muscularis propria along the retrocervical region and contacts but does not definitely invade the posterior cervix. T category: T3c; 2. Mildly irregular 1.0 cm left external iliac lymph node. 3. A 3.2 cm mixed cystic and solid lesion within the central portion of the uterus was likely present dating back to August 2019, possibly reflecting a submucosal fibroid or adenomyoma protruding into the endometrial cavity; GYN consultation may be of use. - 06/11/2024 started CapeOx as Oxaliplatin and capecitabine 14/21D Q21D. - 06/16/2024 CT A/P - Unchanged oncologic findings as above with large perirectal mass and abscess now contains a small focus of air. Stable resulting right hydronephrosis from obstructing the distal ureter. No emergent findings identified. Refer to prior report from 5/21/2024 for further details. - 07/03/2024 capecitabine dose reduction to 1000 mg PO BID 14/21D due to nausea and vomiting that resolved post IV hydration with Zofran and Pepcid IV. - 09/23/2024 MR Rectal - Since the prior examination on 5/6/2024, the primary tumor shows: Partial treatment response (status post 4 of 6 cycles of chemotherapy). No new lesions. Interval decrease in size of the known mid to high rectal tumor now measuring 6.0 cm in craniocaudal dimension, previously 8.4 cm. There remains gross invasion of the perirectal fat contacting the mesorectal fascia posteriorly (8/23) and the anterior peritoneal reflection (8/13). No lymphadenopathy. - 10/30/2024 completed 6 cycles of CapeOx as Oxaliplatin 130 mg/m2 with 20% dose reduction = 104 mg/m2 = 145 mg IV and capecitabine 1000 mg BID 14/21D. - 11/22/2024 CT C/A/P - Since abdominal CT scan of June 16, 2024, Left lower quadrant colostomy device. No bowel obstruction or ascites. No significant interval change in large complex rectal/perirectal pelvic mass. No suspicious hepatic or intrathoracic lesions. - 01/08/2025 PET - Extensive FDG avid rectal mass, SUV max 14.5. 1 cm FDG avid right hilar lymph node, SUV max 11.7-suspicious for biologic tumor activity. No suspicious FDG avid pulmonary nodules or parenchymal lesions. No FDG avid adenopathy in the abdomen and pelvis. As she is no longer a candidate for RT, will arrange a possible EBUS reached out to pulmonary. In the interim will restart CapOx as she has more rectal pain and she has not had chemotherapy since essentially end of October. - 01/15/2025 restart CapeOx as   01/15/2025 Labs reviewed and results discussed with the patient. Lab reviewed, compared to previous and results were discussed with the patient, her  and her daughter. The patient was made aware of the medical status. The following were discussed: diagnosis, present clinical impression, diagnostic results, prognosis, and treatment options as well as potential risks, adverse reactions and benefits of medication. The patient was informed about risks and outcomes of untreated conditions and/or poor compliance. The patient verbalized full understanding of the above issues and will contact our office with any additional questions. The patient verbalized understanding of the necessity of the treatment, agreed to the recommendations and consent to the treatment plan to restart CapOx. She will also be referred to Pain Management team, meanwhile she will try Tylenol [place] in addition to her current Tramadol regiment (we have problems scribing her pain Rx as she has a lot of INS restrictions). All questions were answered to satisfaction.  PLAN: - restart Oxaliplatin 104 mg/m2 = 145 mg IV and capecitabine 1000 mg BID D1-14 Q21D - C1 GIVE TODAY. - continue Tramadol 100 mg PO Q8H PRN for pain - ordered. - repeat PET in 3 months - 04/2025. - Labs today: CBC CMP Mg RTC in 3 weeks with CBC CMP Mg and same day treatment. Carmela (daughter) [phone]

## 2025-01-18 NOTE — PHYSICAL EXAM
[Restricted in physically strenuous activity but ambulatory and able to carry out work of a light or sedentary nature] : Status 1- Restricted in physically strenuous activity but ambulatory and able to carry out work of a light or sedentary nature, e.g., light house work, office work [Normal] : affect appropriate [de-identified] : ostomy - looks well.

## 2025-01-18 NOTE — ASSESSMENT
[Curative] : Goals of care discussed with patient: Curative [Medication(s)] : Medication(s) [FreeTextEntry1] : # Perforated rectal cancer, adenocarcinoma. - MMRp. - 04/08/2024 s/p sigmoid loop colostomy, was on IV antibiotics. - no metastasis on CT C/A/P - Rectal MRI for staging showed bulky disease 8.4 cm, T3C N0 - start FOLFOX today but unfortunately port was not placed and is pending on Friday, she is feeling well. Plan was to add irinotecan in the future, but she is very hesitant about FOLFIRINOX and we had an extensive discussion I explained the possible benefits of additional irinotecan but decided to proceed with just CapeOx for 3 to 4 months of treatment will aim for 6 cycles followed by long course chemoradiation. - We spoke about side effects of oxaliplatin before we spoke with capecitabine today which included rash as well as diarrhea rarely DPD deficiency severe mucositis and she is aware of approximately 1% chance of death - 05/06/2024 MR pelvis/rectal/anal - Status post diverting sigmoid loop colostomy. Small fat-containing paraumbilical hernia. A 3.2 x 3.0 x 2.3 cm mixed cystic and solid lesion within the central portion of the uterus possibly reflecting a submucosal fibroid or adenomyoma protruding into the endometrial cavity versus a true endometrial lesion. IMPRESSION: 1. Mid to high rectal tumor measuring 8.4 cm straddles the anterior peritoneal reflection, with evidence of EMVI; tumor extends beyond muscularis propria along the retrocervical region and contacts but does not definitely invade the posterior cervix. T category: T3c; 2. Mildly irregular 1.0 cm left external iliac lymph node. 3. A 3.2 cm mixed cystic and solid lesion within the central portion of the uterus was likely present dating back to August 2019, possibly reflecting a submucosal fibroid or adenomyoma protruding into the endometrial cavity; GYN consultation may be of use. - 06/11/2024 started CapeOx as Oxaliplatin and capecitabine 14/21D Q21D. - 06/16/2024 CT A/P - Unchanged oncologic findings as above with large perirectal mass and abscess now contains a small focus of air. Stable resulting right hydronephrosis from obstructing the distal ureter. No emergent findings identified. Refer to prior report from 5/21/2024 for further details. - 07/03/2024 capecitabine dose reduction to 1000 mg PO BID 14/21D due to nausea and vomiting that resolved post IV hydration with Zofran and Pepcid IV. - 09/23/2024 MR Rectal - Since the prior examination on 5/6/2024, the primary tumor shows: Partial treatment response (status post 4 of 6 cycles of chemotherapy). No new lesions. Interval decrease in size of the known mid to high rectal tumor now measuring 6.0 cm in craniocaudal dimension, previously 8.4 cm. There remains gross invasion of the perirectal fat contacting the mesorectal fascia posteriorly (8/23) and the anterior peritoneal reflection (8/13). No lymphadenopathy. - 10/30/2024 completed 6 cycles of CapeOx as Oxaliplatin 130 mg/m2 with 20% dose reduction = 104 mg/m2 = 145 mg IV and capecitabine 1000 mg BID 14/21D. - 11/22/2024 CT C/A/P - Since abdominal CT scan of June 16, 2024, Left lower quadrant colostomy device. No bowel obstruction or ascites. No significant interval change in large complex rectal/perirectal pelvic mass. No suspicious hepatic or intrathoracic lesions. - 01/08/2025 PET - Extensive FDG avid rectal mass, SUV max 14.5. 1 cm FDG avid right hilar lymph node, SUV max 11.7-suspicious for biologic tumor activity. No suspicious FDG avid pulmonary nodules or parenchymal lesions. No FDG avid adenopathy in the abdomen and pelvis. As she is no longer a candidate for RT, will arrange a possible EBUS reached out to pulmonary. In the interim will restart CapOx as she has more rectal pain and she has not had chemotherapy since essentially end of October. - 01/15/2025 restart CapeOx as   01/15/2025 Labs reviewed and results discussed with the patient. Lab reviewed, compared to previous and results were discussed with the patient, her  and her daughter. The patient was made aware of the medical status. The following were discussed: diagnosis, present clinical impression, diagnostic results, prognosis, and treatment options as well as potential risks, adverse reactions and benefits of medication. The patient was informed about risks and outcomes of untreated conditions and/or poor compliance. The patient verbalized full understanding of the above issues and will contact our office with any additional questions. The patient verbalized understanding of the necessity of the treatment, agreed to the recommendations and consent to the treatment plan to restart CapOx. She will also be referred to Pain Management team, meanwhile she will try Tylenol [place] in addition to her current Tramadol regiment (we have problems scribing her pain Rx as she has a lot of INS restrictions). All questions were answered to satisfaction.  PLAN: - restart Oxaliplatin 104 mg/m2 = 145 mg IV and capecitabine 1000 mg BID D1-14 Q21D - C1 GIVE TODAY. - continue Tramadol 100 mg PO Q8H PRN for pain - ordered. - repeat PET in 3 months - 04/2025. - Labs today: CBC CMP Mg RTC in 3 weeks with CBC CMP Mg and same day treatment. Carmela (daughter) [phone]

## 2025-01-18 NOTE — PHYSICAL EXAM
[Restricted in physically strenuous activity but ambulatory and able to carry out work of a light or sedentary nature] : Status 1- Restricted in physically strenuous activity but ambulatory and able to carry out work of a light or sedentary nature, e.g., light house work, office work [Normal] : affect appropriate [de-identified] : ostomy - looks well.

## 2025-01-18 NOTE — END OF VISIT
[FreeTextEntry3] : I was physically present for the key portions of the evaluation and management service provided.  I agree with the history and physical, and plan which I have reviewed and edited where appropriate.  Richa is here for follow-up planning for her chemoradiation she had a PET scan unfortunately this demonstrated a new 1 cm right hilar lymph node and she is pending an EBUS and biopsy she also has more symptoms now in the pelvic area likely from progression since chemo is been on hold anticipation to start chemoradiation we decided to restart CapeOx today we will follow-up results of her EBUS she has oxycodone for pain and tramadol we spoke about starting long-acting tramadol and also for her to see pain management but she declined pain management for now  She will see me back in 3 weeks

## 2025-01-18 NOTE — ASSESSMENT
[Curative] : Goals of care discussed with patient: Curative [Medication(s)] : Medication(s) [FreeTextEntry1] : # Perforated rectal cancer, adenocarcinoma. - MMRp. - 04/08/2024 s/p sigmoid loop colostomy, was on IV antibiotics. - no metastasis on CT C/A/P - Rectal MRI for staging showed bulky disease 8.4 cm, T3C N0 - start FOLFOX today but unfortunately port was not placed and is pending on Friday, she is feeling well. Plan was to add irinotecan in the future, but she is very hesitant about FOLFIRINOX and we had an extensive discussion I explained the possible benefits of additional irinotecan but decided to proceed with just CapeOx for 3 to 4 months of treatment will aim for 6 cycles followed by long course chemoradiation. - We spoke about side effects of oxaliplatin before we spoke with capecitabine today which included rash as well as diarrhea rarely DPD deficiency severe mucositis and she is aware of approximately 1% chance of death - 05/06/2024 MR pelvis/rectal/anal - Status post diverting sigmoid loop colostomy. Small fat-containing paraumbilical hernia. A 3.2 x 3.0 x 2.3 cm mixed cystic and solid lesion within the central portion of the uterus possibly reflecting a submucosal fibroid or adenomyoma protruding into the endometrial cavity versus a true endometrial lesion. IMPRESSION: 1. Mid to high rectal tumor measuring 8.4 cm straddles the anterior peritoneal reflection, with evidence of EMVI; tumor extends beyond muscularis propria along the retrocervical region and contacts but does not definitely invade the posterior cervix. T category: T3c; 2. Mildly irregular 1.0 cm left external iliac lymph node. 3. A 3.2 cm mixed cystic and solid lesion within the central portion of the uterus was likely present dating back to August 2019, possibly reflecting a submucosal fibroid or adenomyoma protruding into the endometrial cavity; GYN consultation may be of use. - 06/11/2024 started CapeOx as Oxaliplatin and capecitabine 14/21D Q21D. - 06/16/2024 CT A/P - Unchanged oncologic findings as above with large perirectal mass and abscess now contains a small focus of air. Stable resulting right hydronephrosis from obstructing the distal ureter. No emergent findings identified. Refer to prior report from 5/21/2024 for further details. - 07/03/2024 capecitabine dose reduction to 1000 mg PO BID 14/21D due to nausea and vomiting that resolved post IV hydration with Zofran and Pepcid IV. - 09/23/2024 MR Rectal - Since the prior examination on 5/6/2024, the primary tumor shows: Partial treatment response (status post 4 of 6 cycles of chemotherapy). No new lesions. Interval decrease in size of the known mid to high rectal tumor now measuring 6.0 cm in craniocaudal dimension, previously 8.4 cm. There remains gross invasion of the perirectal fat contacting the mesorectal fascia posteriorly (8/23) and the anterior peritoneal reflection (8/13). No lymphadenopathy. - 10/30/2024 completed 6 cycles of CapeOx as Oxaliplatin 130 mg/m2 with 20% dose reduction = 104 mg/m2 = 145 mg IV and capecitabine 1000 mg BID 14/21D. - 11/22/2024 CT C/A/P - Since abdominal CT scan of June 16, 2024, Left lower quadrant colostomy device. No bowel obstruction or ascites. No significant interval change in large complex rectal/perirectal pelvic mass. No suspicious hepatic or intrathoracic lesions. - 01/08/2025 PET - Extensive FDG avid rectal mass, SUV max 14.5. 1 cm FDG avid right hilar lymph node, SUV max 11.7-suspicious for biologic tumor activity. No suspicious FDG avid pulmonary nodules or parenchymal lesions. No FDG avid adenopathy in the abdomen and pelvis. As she is no longer a candidate for RT, will arrange a possible EBUS reached out to pulmonary. In the interim will restart CapOx as she has more rectal pain and she has not had chemotherapy since essentially end of October. - 01/15/2025 restart CapeOx as   01/15/2025 Labs reviewed and results discussed with the patient. Lab reviewed, compared to previous and results were discussed with the patient, her  and her daughter. The patient was made aware of the medical status. The following were discussed: diagnosis, present clinical impression, diagnostic results, prognosis, and treatment options as well as potential risks, adverse reactions and benefits of medication. The patient was informed about risks and outcomes of untreated conditions and/or poor compliance. The patient verbalized full understanding of the above issues and will contact our office with any additional questions. The patient verbalized understanding of the necessity of the treatment, agreed to the recommendations and consent to the treatment plan to restart CapOx. She will also be referred to Pain Management team, meanwhile she will try Tylenol [place] in addition to her current Tramadol regiment (we have problems scribing her pain Rx as she has a lot of INS restrictions). All questions were answered to satisfaction.  PLAN: - restart Oxaliplatin 104 mg/m2 = 145 mg IV and capecitabine 1000 mg BID D1-14 Q21D - C1 GIVE TODAY. - continue Tramadol 100 mg PO Q8H PRN for pain - ordered. - repeat PET in 3 months - 04/2025. - Labs today: CBC CMP Mg RTC in 3 weeks with CBC CMP Mg and same day treatment. Carmela (daughter) [phone]

## 2025-01-31 NOTE — DISEASE MANAGEMENT
[Clinical] : TNM Stage: c [FreeTextEntry4] : invasive adenocarcinoma of the rectum, G2 [TTNM] : 3 [NTNM] : 1 [MTNM] : 0 [IIIB] : IIIB [de-identified] : 1200cGy [de-identified] : 2000cGy [de-identified] : pelvis

## 2025-01-31 NOTE — ASSESSMENT
[FreeTextEntry1] : 58 y/o F with pmhx of rectal adenocarcinoma s/p chemotherapy and currently undergoing radiation referred to me for evaluation and management of cancer-associated pain. I have seen this patient as an inpatient in the hospital recently and performed a ganglion impar block for her severe rectal pain with >50% relief. Additionally, patient is currently taking prescribed Morphine ER, Morphine IR, acetaminophen and ibuprofen. She states that her pain is currently well controlled, though is in slightly worse pain right now as she was at a different doctor's appointment and did not take her pain medication. Is here for medication refill.   #Cancer-associated pain -Patient's pain well controlled on current regimen. -Continue Morphine ER 15 mg q8hrs JANELL. -Continue Morphine IR 15 mg q6hrs PRN severe breakthrough pain. -Acetaminophen 975 mg PO q8hrs PRN  -Ibuprofen 400 mg PO q8hrs PRN -If patient's pain worsens as underlying disease progresses, will consider neurolytic ganglion impar block as regular ganglion impar block provided significant relief.  -Follow up in a month.

## 2025-01-31 NOTE — PHYSICAL EXAM
[UE/LE] : Sensory: Intact in bilateral upper & lower extremities [ALL] : dorsalis pedis, posterior tibial, femoral, popliteal, and radial 2+ and symmetric bilaterally [Cranial Nerves Optic (II)] : visual acuity intact bilaterally,  visual fields full to confrontation, pupils equal round and reactive to light [Cranial Nerves Oculomotor (III)] : extraocular motion intact [Cranial Nerves Trigeminal (V)] : facial sensation intact symmetrically [Cranial Nerves Facial (VII)] : face symmetrical [Cranial Nerves Vestibulocochlear (VIII)] : hearing was intact bilaterally [Cranial Nerves Glossopharyngeal (IX)] : tongue and palate midline [Cranial Nerves Accessory (XI - Cranial And Spinal)] : head turning and shoulder shrug symmetric [Cranial Nerves Hypoglossal (XII)] : there was no tongue deviation with protrusion [Normal] : Normal affect

## 2025-01-31 NOTE — HISTORY OF PRESENT ILLNESS
[de-identified] : Ms. ANN MERCER is a 57 year  old woman. She presented to the office for the first time on 01/31/2025 , her primary is KELSEA DEMARCO . 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

## 2025-01-31 NOTE — REASON FOR VISIT
[Routine On-Treatment] : a routine on-treatment visit for [Rectal Cancer] : cancer [Other: ___] : [unfilled]

## 2025-01-31 NOTE — DISEASE MANAGEMENT
[Clinical] : TNM Stage: c [FreeTextEntry4] : invasive adenocarcinoma of the rectum, G2 [TTNM] : 3 [NTNM] : 1 [MTNM] : 0 [IIIB] : IIIB [de-identified] : 1200cGy [de-identified] : 2000cGy [de-identified] : pelvis

## 2025-01-31 NOTE — ASSESSMENT
[FreeTextEntry1] : 56 y/o F with pmhx of rectal adenocarcinoma s/p chemotherapy and currently undergoing radiation referred to me for evaluation and management of cancer-associated pain. I have seen this patient as an inpatient in the hospital recently and performed a ganglion impar block for her severe rectal pain with >50% relief. Additionally, patient is currently taking prescribed Morphine ER, Morphine IR, acetaminophen and ibuprofen. She states that her pain is currently well controlled, though is in slightly worse pain right now as she was at a different doctor's appointment and did not take her pain medication. Is here for medication refill.   #Cancer-associated pain -Patient's pain well controlled on current regimen. -Continue Morphine ER 15 mg q8hrs JANELL. -Continue Morphine IR 15 mg q6hrs PRN severe breakthrough pain. -Acetaminophen 975 mg PO q8hrs PRN  -Ibuprofen 400 mg PO q8hrs PRN -If patient's pain worsens as underlying disease progresses, will consider neurolytic ganglion impar block as regular ganglion impar block provided significant relief.  -Follow up in a month.

## 2025-01-31 NOTE — BEGINNING OF VISIT
[0] : 2) Feeling down, depressed, or hopeless: Not at all (0) [PHQ-2 Negative] : PHQ-2 Negative [PHQ-9 Negative] : PHQ-9 Negative [LMS2Gltcy] : 0 [Pain Scale: ___] : On a scale of 1-10, today the patient's pain is a(n) [unfilled]. [Never] : Never [Date Discussed (MM/DD/YY): ___] : Discussed: [unfilled] [Reviewed, no changes] : Reviewed, no changes

## 2025-01-31 NOTE — HISTORY OF PRESENT ILLNESS
[___ yrs] : [unfilled] year(s) ago [Constant] : constant [7] : a current pain level of 7/10 [4] : an average pain level of 4/10 [3] : a minimum pain level of 3/10 [10] : a maximum pain level of 10/10 [Sharp] : sharp [Aching] : aching [Throbbing] : throbbing [Sitting] : sitting [Transitioning] : transitioning [Laying] : laying [FreeTextEntry1] : 58 y/o F with pmhx of rectal adenocarcinoma s/p chemotherapy and currently undergoing radiation referred to me for evaluation and management of cancer-associated pain. I have seen this patient as an inpatient in the hospital recently and performed a ganglion impar block for her severe rectal pain with >50% relief. Additionally, patient is currently taking prescribed Morphine ER, Morphine IR, acetaminophen and ibuprofen. She states that her pain is currently well controlled, though is in slightly worse pain right now as she was at a different doctor's appointment and did not take her pain medication. Is here for medication refill.  [FreeTextEntry7] : Rectal pain

## 2025-01-31 NOTE — HISTORY OF PRESENT ILLNESS
[___ yrs] : [unfilled] year(s) ago [Constant] : constant [7] : a current pain level of 7/10 [4] : an average pain level of 4/10 [3] : a minimum pain level of 3/10 [10] : a maximum pain level of 10/10 [Sharp] : sharp [Aching] : aching [Throbbing] : throbbing [Sitting] : sitting [Transitioning] : transitioning [Laying] : laying [FreeTextEntry1] : 56 y/o F with pmhx of rectal adenocarcinoma s/p chemotherapy and currently undergoing radiation referred to me for evaluation and management of cancer-associated pain. I have seen this patient as an inpatient in the hospital recently and performed a ganglion impar block for her severe rectal pain with >50% relief. Additionally, patient is currently taking prescribed Morphine ER, Morphine IR, acetaminophen and ibuprofen. She states that her pain is currently well controlled, though is in slightly worse pain right now as she was at a different doctor's appointment and did not take her pain medication. Is here for medication refill.  [FreeTextEntry7] : Rectal pain

## 2025-01-31 NOTE — DISEASE MANAGEMENT
[Clinical] : TNM Stage: c [FreeTextEntry4] : invasive adenocarcinoma of the rectum, G2 [TTNM] : 3 [NTNM] : 1 [MTNM] : 0 [IIIB] : IIIB [de-identified] : 1200cGy [de-identified] : 2000cGy [de-identified] : pelvis

## 2025-01-31 NOTE — BEGINNING OF VISIT
[0] : 2) Feeling down, depressed, or hopeless: Not at all (0) [PHQ-2 Negative] : PHQ-2 Negative [PHQ-9 Negative] : PHQ-9 Negative [WIB6Ycgmi] : 0 [Pain Scale: ___] : On a scale of 1-10, today the patient's pain is a(n) [unfilled]. [Never] : Never [Date Discussed (MM/DD/YY): ___] : Discussed: [unfilled] [Reviewed, no changes] : Reviewed, no changes

## 2025-02-07 NOTE — REASON FOR VISIT
[Home] : at home, [unfilled] , at the time of the visit. [Medical Office: ([facility])___] : at the medical office located in  [Patient] : the patient [Self] : self [Initial Consultation] : an initial consultation for [Rectal Cancer] : rectal cancer [Family Member] : family member

## 2025-02-07 NOTE — REASON FOR VISIT
[Home] : at home, [unfilled] , at the time of the visit. [Medical Office: ([facility])___] : at the medical office located in  [Patient] : the patient [Self] : self [Initial Consultation] : an initial consultation for [Rectal Cancer] : rectal cancer [Family Member] : family member

## 2025-02-07 NOTE — REASON FOR VISIT
[Home] : at home, [unfilled] , at the time of the visit. [Medical Office: ([facility])___] : at the medical office located in  [Patient] : the patient [Self] : self [Initial Consultation] : an initial consultation for [Rectal Cancer] : rectal cancer [Family Member] : family member

## 2025-02-07 NOTE — REASON FOR VISIT
[Home] : at home, [unfilled] , at the time of the visit. [Medical Office: ([facility])___] : at the medical office located in  [Patient] : the patient [Self] : self [Initial Consultation] : an initial consultation for [Rectal Cancer] : rectal cancer [Family Member] : family member

## 2025-02-11 NOTE — ASSESSMENT
[Medication(s)] : Medication(s) [Curative] : Goals of care discussed with patient: Curative [FreeTextEntry1] : # Perforated rectal cancer, adenocarcinoma. - MMRp. - 04/08/2024 s/p sigmoid loop colostomy, was on IV antibiotics. - no metastasis on CT C/A/P. - Rectal MRI for staging showed bulky disease 8.4 cm, T3C N0 - start FOLFOX, but unfortunately PORT was not placed and is pending on Friday, she is feeling well. Plan was to add irinotecan in the future, but she is very hesitant about FOLFIRINOX and we had an extensive discussion I explained the possible benefits of additional irinotecan, but decided to proceed with just CapeOx for 3 to 4 months of treatment will aim for 6 cycles followed by long course chemoradiation. - We spoke about side effects of oxaliplatin before we spoke with capecitabine today which included rash as well as diarrhea rarely DPD deficiency severe mucositis and she is aware of approximately 1% chance of death - 05/06/2024 MR pelvis/rectal/anal - Status post diverting sigmoid loop colostomy. Small fat-containing paraumbilical hernia. A 3.2 x 3.0 x 2.3 cm mixed cystic and solid lesion within the central portion of the uterus possibly reflecting a submucosal fibroid or adenomyoma protruding into the endometrial cavity versus a true endometrial lesion. IMPRESSION: 1. Mid to high rectal tumor measuring 8.4 cm straddles the anterior peritoneal reflection, with evidence of EMVI; tumor extends beyond muscularis propria along the retrocervical region and contacts but does not definitely invade the posterior cervix. T category: T3c; 2. Mildly irregular 1.0 cm left external iliac lymph node. 3. A 3.2 cm mixed cystic and solid lesion within the central portion of the uterus was likely present dating back to August 2019, possibly reflecting a submucosal fibroid or adenomyoma protruding into the endometrial cavity; GYN consultation may be of use. - 06/11/2024 started CapeOx as Oxaliplatin and capecitabine 14/21D Q21D. - 06/16/2024 CT A/P - Unchanged oncologic findings as above with large perirectal mass and abscess now contains a small focus of air. Stable resulting right hydronephrosis from obstructing the distal ureter. No emergent findings identified. Refer to prior report from 5/21/2024 for further details. - 07/03/2024 capecitabine dose reduction to 1000 mg PO BID 14/21D due to nausea and vomiting that resolved post IV hydration with Zofran and Pepcid IV. - 09/23/2024 MR Rectal - Since the prior examination on 5/6/2024, the primary tumor shows: Partial treatment response (status post 4 of 6 cycles of chemotherapy). No new lesions. Interval decrease in size of the known mid to high rectal tumor now measuring 6.0 cm in craniocaudal dimension, previously 8.4 cm. There remains gross invasion of the perirectal fat contacting the mesorectal fascia posteriorly (8/23) and the anterior peritoneal reflection (8/13). No lymphadenopathy. - 10/30/2024 completed 6 cycles of CapeOx as Oxaliplatin 130 mg/m2 with 20% dose reduction = 104 mg/m2 = 145 mg IV and capecitabine 1000 mg BID 14/21D. - 11/22/2024 CT C/A/P - Since abdominal CT scan of June 16, 2024, Left lower quadrant colostomy device. No bowel obstruction or ascites. No significant interval change in large complex rectal/perirectal pelvic mass. No suspicious hepatic or intrathoracic lesions. - 01/08/2025 PET - Extensive FDG avid rectal mass, SUV max 14.5. 1 cm FDG avid right hilar lymph node, SUV max 11.7-suspicious for biologic tumor activity. No suspicious FDG avid pulmonary nodules or parenchymal lesions. No FDG avid adenopathy in the abdomen and pelvis. As she is no longer a candidate for RT, will arrange a possible EBUS reached out to pulmonary. In the interim will restart CapOx as she has more rectal pain and she has not had chemotherapy since essentially end of October. - 01/15/2025 restarted CapeOx as Oxaliplatin 104 mg/m2 = 145 mg IV D1 and capecitabine 1000 mg BID D1-14 Q21D.  02/05/2025 Labs reviewed and results discussed with the patient and her son with her daughter on the phone; She is planning to see Dr. Wells for second opinion about The possibility of surgery - remains clinically stable to continue current management. All questions were answered to satisfaction.  PLAN: - continue Oxaliplatin 104 mg/m2 = 145 mg IV D1 and capecitabine 1000 mg BID D1-14 Q21D - C2 GIVE TODAY. - continue Tramadol 100 mg PO Q8H PRN for pain - She is now follow-up by pain management team for all her pain management issues and adjustments. - repeat PET in 3 months - 04/2025. - Labs today: CBC CMP Mg - Will order CEA level as per Dr. Wells request. RTC in 3 weeks with CBC CMP Mg and same day treatment. Carmela (daughter) [phone]

## 2025-02-11 NOTE — HISTORY OF PRESENT ILLNESS
[Disease: _____________________] : Disease: [unfilled] [T: ___] : T[unfilled] [N: ___] : N[unfilled] [M: ___] : M[unfilled] [AJCC Stage: ____] : AJCC Stage: [unfilled] [de-identified] : 58 y/o female w/ no significant PMHx who presented to [facility] on 3/30/24 complaining of rectal pain associated w/ BRBPR for past several weeks. Workup included a CT scan of the abdomen which demonstrated a low rectal 9cm long segment of mass-like thickening, suspicious for rectal cancer, associated w/ abscess and possible local perforation. Patient was transferred to [facility] for further evaluation and management.  Patient underwent IR drainage of x2 perirectal abscess (10cc purulent fluid drained from each) on 4/2. Abscess cultures were positive for ESBL E.coli. She completed 7 days of Zosyn prior to culture results. Antibiotics were changed to 1g q8h beginning on 4/9.  She is now s/p sigmoid loop colostomy creation with flexible sigmoidoscopy, and biopsies on 4/8/24 with Dr. Croonel. Intra-operatively, patient was noted to have a large necrotizing mass 6cm from anal verge. 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  On 4/12/24 she was cleared for discharge. A midline catheter was placed in the hospital. She will complete IV antibiotics (Ertapenem 1g QD) for 10 days from 4/12 to 4/22  She has been having CRP done as outpatient and they are coming down but still elevated and she was instructed to remain on IV antibiotics for another week.  CT C/A/P did not show any distant metastasis: 3/30 and 3/31/2024: No evidence of acute intrathoracic pathology  Nonspecific lobulated cystic structure within the uterine dome. This may reflect an atypical fibroid. Nonemergent pelvic ultrasound or MRI may be of benefit.  Most recent blood work from 4/24/2024 white blood cell count 6.4 hemoglobin 8.9 platelets 500,000 CMP normal LFTs albumin is 3.2 creatinine 0.7 rest of CMP is normal ferritin was 150 on 411 iron saturation 6%.  Most recent CRP 29 from 430 was 76 on 417  CA 19-9 11 CA 19-9 116 on 3/31/2024  She is under the care of Dr. Coronel. She was seen by inpatient onc team. She as presented in Tumor board. Plan is to start AMIRA with chemotherapy. [de-identified] : adenocarcinoma MMR proficent  [de-identified] : 5/15/2024 She is here for follow up. She had MR rectum:  TUMOR LOCATION: Mid to high rectal tumor Tumor location descriptor: Posterior Distance to anal verge (lowest extent of tumor): 6.2 cm Distance to top of anal sphincter complex (lowest extent of tumor): 3.1 cm Relationship to anterior peritoneal reflection: Straddles. TUMOR CHARACTERISTICS: Craniocaudal length: 8.4 cm Morphology: Polypoid Mucinous: Some mucin. T CATEGORY: T3c (tumor penetrates >5-15 mm beyond muscularis propria); tumor extends approximately 11 mm beyond the muscularis propria along the retrocervical region and contacts but does not definitely invade the posterior cervix (3/8.) EXTRAMURAL VASCULAR INVASION (EMVI): Present Location of EMVI (indicate series and image number): In the presacral region, along the posterior aspect of the tumor (3/15) Mesorectal Fascial (MRF) (FOR T3 ONLY): Distance between tumor and MRF: 0 mm LYMPH NODES (suspicious criteria: round shape, irregular borders, heterogeneous signal intensity): Mesorectal/Superior Rectal Lymph Nodes: N0: No visible lymph nodes/deposits Locoregional (Internal Iliac, Obturator, Inguinal below dentate line): N/A Distant (Common Iliac, External Iliac, Inguinal, Retroperitoneal): Mildly irregular left external iliac node measuring 1.0 x 0.7 cm (4/17.) She completed her antibiotics.  07/03/2024 accompanied by her daughter; Reports nausea started on 3rd day post treatment and had vomited 3 times that lasted for about 1 week. She was in ER for low grade fever and LBP, tramadol by Dr. Coronel did not work. She was d/c on Augmentin for 10 day. She feels much better now.  The patient's daughter reported the patient does not have adequate oral hydration at home.  This is the time when she felt nauseous.  07/24/2024 accompanied by her daughter Carmela and her son; Reports feeling much better after IV hydration with Zofran and Pepcid IV last week. She has no more diarrhea and/or vomiting, still with some occasional nausea.  08/21/2024 accompanied by her son; Reports feeling well at the baseline of her health status, no changes in chronic conditions or new complaints since the last visit.  09/25/2024 She is here today with her son to continue the treatment.  She mentioned that she has a very difficult situation where she may lose her insurance and will not be able to continue her cancer care.  Social work team was involved, but unfortunately unable to resolve the issue.  She will continue to work toward not losing her insurance.  She states that she feels much better after iron infusions.  10/30/2024 She is here accompanied by her daughter and her son. She is back to work on a light work schedule. She is happy to be back to work. She is feeling fine.  12/12/2024 She is here for F/U accompanied by her daughter and son. She is back to work. Still with her now chronic issues. Ready to start Chemo/RT, but not scheduled yet.  01/15/2025 accompanied by her  and daughter; Reports her pain is not getting better.  02/05/2025 She is here today for follow-up accompanied by her son and her daughter on the phone. She reports no changes in her chronic issues.

## 2025-02-11 NOTE — ASSESSMENT
[Medication(s)] : Medication(s) [Curative] : Goals of care discussed with patient: Curative [FreeTextEntry1] : # Perforated rectal cancer, adenocarcinoma. - MMRp. - 04/08/2024 s/p sigmoid loop colostomy, was on IV antibiotics. - no metastasis on CT C/A/P. - Rectal MRI for staging showed bulky disease 8.4 cm, T3C N0 - start FOLFOX, but unfortunately PORT was not placed and is pending on Friday, she is feeling well. Plan was to add irinotecan in the future, but she is very hesitant about FOLFIRINOX and we had an extensive discussion I explained the possible benefits of additional irinotecan, but decided to proceed with just CapeOx for 3 to 4 months of treatment will aim for 6 cycles followed by long course chemoradiation. - We spoke about side effects of oxaliplatin before we spoke with capecitabine today which included rash as well as diarrhea rarely DPD deficiency severe mucositis and she is aware of approximately 1% chance of death - 05/06/2024 MR pelvis/rectal/anal - Status post diverting sigmoid loop colostomy. Small fat-containing paraumbilical hernia. A 3.2 x 3.0 x 2.3 cm mixed cystic and solid lesion within the central portion of the uterus possibly reflecting a submucosal fibroid or adenomyoma protruding into the endometrial cavity versus a true endometrial lesion. IMPRESSION: 1. Mid to high rectal tumor measuring 8.4 cm straddles the anterior peritoneal reflection, with evidence of EMVI; tumor extends beyond muscularis propria along the retrocervical region and contacts but does not definitely invade the posterior cervix. T category: T3c; 2. Mildly irregular 1.0 cm left external iliac lymph node. 3. A 3.2 cm mixed cystic and solid lesion within the central portion of the uterus was likely present dating back to August 2019, possibly reflecting a submucosal fibroid or adenomyoma protruding into the endometrial cavity; GYN consultation may be of use. - 06/11/2024 started CapeOx as Oxaliplatin and capecitabine 14/21D Q21D. - 06/16/2024 CT A/P - Unchanged oncologic findings as above with large perirectal mass and abscess now contains a small focus of air. Stable resulting right hydronephrosis from obstructing the distal ureter. No emergent findings identified. Refer to prior report from 5/21/2024 for further details. - 07/03/2024 capecitabine dose reduction to 1000 mg PO BID 14/21D due to nausea and vomiting that resolved post IV hydration with Zofran and Pepcid IV. - 09/23/2024 MR Rectal - Since the prior examination on 5/6/2024, the primary tumor shows: Partial treatment response (status post 4 of 6 cycles of chemotherapy). No new lesions. Interval decrease in size of the known mid to high rectal tumor now measuring 6.0 cm in craniocaudal dimension, previously 8.4 cm. There remains gross invasion of the perirectal fat contacting the mesorectal fascia posteriorly (8/23) and the anterior peritoneal reflection (8/13). No lymphadenopathy. - 10/30/2024 completed 6 cycles of CapeOx as Oxaliplatin 130 mg/m2 with 20% dose reduction = 104 mg/m2 = 145 mg IV and capecitabine 1000 mg BID 14/21D. - 11/22/2024 CT C/A/P - Since abdominal CT scan of June 16, 2024, Left lower quadrant colostomy device. No bowel obstruction or ascites. No significant interval change in large complex rectal/perirectal pelvic mass. No suspicious hepatic or intrathoracic lesions. - 01/08/2025 PET - Extensive FDG avid rectal mass, SUV max 14.5. 1 cm FDG avid right hilar lymph node, SUV max 11.7-suspicious for biologic tumor activity. No suspicious FDG avid pulmonary nodules or parenchymal lesions. No FDG avid adenopathy in the abdomen and pelvis. As she is no longer a candidate for RT, will arrange a possible EBUS reached out to pulmonary. In the interim will restart CapOx as she has more rectal pain and she has not had chemotherapy since essentially end of October. - 01/15/2025 restarted CapeOx as Oxaliplatin 104 mg/m2 = 145 mg IV D1 and capecitabine 1000 mg BID D1-14 Q21D.  02/05/2025 Labs reviewed and results discussed with the patient and her son with her daughter on the phone; She is planning to see Dr. Wells for second opinion about The possibility of surgery - remains clinically stable to continue current management. All questions were answered to satisfaction.  PLAN: - continue Oxaliplatin 104 mg/m2 = 145 mg IV D1 and capecitabine 1000 mg BID D1-14 Q21D - C2 GIVE TODAY. - continue Tramadol 100 mg PO Q8H PRN for pain - She is now follow-up by pain management team for all her pain management issues and adjustments. - repeat PET in 3 months - 04/2025. - Labs today: CBC CMP Mg - Will order CEA level as per Dr. Wells request. RTC in 3 weeks with CBC CMP Mg and same day treatment. Carmela (daughter) [phone]

## 2025-02-11 NOTE — PHYSICAL EXAM
[Restricted in physically strenuous activity but ambulatory and able to carry out work of a light or sedentary nature] : Status 1- Restricted in physically strenuous activity but ambulatory and able to carry out work of a light or sedentary nature, e.g., light house work, office work [Normal] : affect appropriate [de-identified] : ostomy - looks well.

## 2025-02-11 NOTE — ASSESSMENT
[Medication(s)] : Medication(s) [Curative] : Goals of care discussed with patient: Curative [FreeTextEntry1] : # Perforated rectal cancer, adenocarcinoma. - MMRp. - 04/08/2024 s/p sigmoid loop colostomy, was on IV antibiotics. - no metastasis on CT C/A/P. - Rectal MRI for staging showed bulky disease 8.4 cm, T3C N0 - start FOLFOX, but unfortunately PORT was not placed and is pending on Friday, she is feeling well. Plan was to add irinotecan in the future, but she is very hesitant about FOLFIRINOX and we had an extensive discussion I explained the possible benefits of additional irinotecan, but decided to proceed with just CapeOx for 3 to 4 months of treatment will aim for 6 cycles followed by long course chemoradiation. - We spoke about side effects of oxaliplatin before we spoke with capecitabine today which included rash as well as diarrhea rarely DPD deficiency severe mucositis and she is aware of approximately 1% chance of death - 05/06/2024 MR pelvis/rectal/anal - Status post diverting sigmoid loop colostomy. Small fat-containing paraumbilical hernia. A 3.2 x 3.0 x 2.3 cm mixed cystic and solid lesion within the central portion of the uterus possibly reflecting a submucosal fibroid or adenomyoma protruding into the endometrial cavity versus a true endometrial lesion. IMPRESSION: 1. Mid to high rectal tumor measuring 8.4 cm straddles the anterior peritoneal reflection, with evidence of EMVI; tumor extends beyond muscularis propria along the retrocervical region and contacts but does not definitely invade the posterior cervix. T category: T3c; 2. Mildly irregular 1.0 cm left external iliac lymph node. 3. A 3.2 cm mixed cystic and solid lesion within the central portion of the uterus was likely present dating back to August 2019, possibly reflecting a submucosal fibroid or adenomyoma protruding into the endometrial cavity; GYN consultation may be of use. - 06/11/2024 started CapeOx as Oxaliplatin and capecitabine 14/21D Q21D. - 06/16/2024 CT A/P - Unchanged oncologic findings as above with large perirectal mass and abscess now contains a small focus of air. Stable resulting right hydronephrosis from obstructing the distal ureter. No emergent findings identified. Refer to prior report from 5/21/2024 for further details. - 07/03/2024 capecitabine dose reduction to 1000 mg PO BID 14/21D due to nausea and vomiting that resolved post IV hydration with Zofran and Pepcid IV. - 09/23/2024 MR Rectal - Since the prior examination on 5/6/2024, the primary tumor shows: Partial treatment response (status post 4 of 6 cycles of chemotherapy). No new lesions. Interval decrease in size of the known mid to high rectal tumor now measuring 6.0 cm in craniocaudal dimension, previously 8.4 cm. There remains gross invasion of the perirectal fat contacting the mesorectal fascia posteriorly (8/23) and the anterior peritoneal reflection (8/13). No lymphadenopathy. - 10/30/2024 completed 6 cycles of CapeOx as Oxaliplatin 130 mg/m2 with 20% dose reduction = 104 mg/m2 = 145 mg IV and capecitabine 1000 mg BID 14/21D. - 11/22/2024 CT C/A/P - Since abdominal CT scan of June 16, 2024, Left lower quadrant colostomy device. No bowel obstruction or ascites. No significant interval change in large complex rectal/perirectal pelvic mass. No suspicious hepatic or intrathoracic lesions. - 01/08/2025 PET - Extensive FDG avid rectal mass, SUV max 14.5. 1 cm FDG avid right hilar lymph node, SUV max 11.7-suspicious for biologic tumor activity. No suspicious FDG avid pulmonary nodules or parenchymal lesions. No FDG avid adenopathy in the abdomen and pelvis. As she is no longer a candidate for RT, will arrange a possible EBUS reached out to pulmonary. In the interim will restart CapOx as she has more rectal pain and she has not had chemotherapy since essentially end of October. - 01/15/2025 restarted CapeOx as Oxaliplatin 104 mg/m2 = 145 mg IV D1 and capecitabine 1000 mg BID D1-14 Q21D.  02/05/2025 Labs reviewed and results discussed with the patient and her son with her daughter on the phone; She is planning to see Dr. Wells for second opinion about The possibility of surgery - remains clinically stable to continue current management. All questions were answered to satisfaction.  PLAN: - continue Oxaliplatin 104 mg/m2 = 145 mg IV D1 and capecitabine 1000 mg BID D1-14 Q21D - C2 GIVE TODAY. - continue Tramadol 100 mg PO Q8H PRN for pain - She is now follow-up by pain management team for all her pain management issues and adjustments. - repeat PET in 3 months - 04/2025. - Labs today: CBC CMP Mg - Will order CEA level as per Dr. Wells request. RTC in 3 weeks with CBC CMP Mg and same day treatment. Carmela (daughter) [phone]

## 2025-02-11 NOTE — HISTORY OF PRESENT ILLNESS
[Disease: _____________________] : Disease: [unfilled] [T: ___] : T[unfilled] [N: ___] : N[unfilled] [M: ___] : M[unfilled] [AJCC Stage: ____] : AJCC Stage: [unfilled] [de-identified] : 56 y/o female w/ no significant PMHx who presented to [facility] on 3/30/24 complaining of rectal pain associated w/ BRBPR for past several weeks. Workup included a CT scan of the abdomen which demonstrated a low rectal 9cm long segment of mass-like thickening, suspicious for rectal cancer, associated w/ abscess and possible local perforation. Patient was transferred to [facility] for further evaluation and management.  Patient underwent IR drainage of x2 perirectal abscess (10cc purulent fluid drained from each) on 4/2. Abscess cultures were positive for ESBL E.coli. She completed 7 days of Zosyn prior to culture results. Antibiotics were changed to 1g q8h beginning on 4/9.  She is now s/p sigmoid loop colostomy creation with flexible sigmoidoscopy, and biopsies on 4/8/24 with Dr. Coronel. Intra-operatively, patient was noted to have a large necrotizing mass 6cm from anal verge. 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  On 4/12/24 she was cleared for discharge. A midline catheter was placed in the hospital. She will complete IV antibiotics (Ertapenem 1g QD) for 10 days from 4/12 to 4/22  She has been having CRP done as outpatient and they are coming down but still elevated and she was instructed to remain on IV antibiotics for another week.  CT C/A/P did not show any distant metastasis: 3/30 and 3/31/2024: No evidence of acute intrathoracic pathology  Nonspecific lobulated cystic structure within the uterine dome. This may reflect an atypical fibroid. Nonemergent pelvic ultrasound or MRI may be of benefit.  Most recent blood work from 4/24/2024 white blood cell count 6.4 hemoglobin 8.9 platelets 500,000 CMP normal LFTs albumin is 3.2 creatinine 0.7 rest of CMP is normal ferritin was 150 on 411 iron saturation 6%.  Most recent CRP 29 from 430 was 76 on 417  CA 19-9 11 CA 19-9 116 on 3/31/2024  She is under the care of Dr. Coronel. She was seen by inpatient onc team. She as presented in Tumor board. Plan is to start AMIRA with chemotherapy. [de-identified] : adenocarcinoma MMR proficent  [de-identified] : 5/15/2024 She is here for follow up. She had MR rectum:  TUMOR LOCATION: Mid to high rectal tumor Tumor location descriptor: Posterior Distance to anal verge (lowest extent of tumor): 6.2 cm Distance to top of anal sphincter complex (lowest extent of tumor): 3.1 cm Relationship to anterior peritoneal reflection: Straddles. TUMOR CHARACTERISTICS: Craniocaudal length: 8.4 cm Morphology: Polypoid Mucinous: Some mucin. T CATEGORY: T3c (tumor penetrates >5-15 mm beyond muscularis propria); tumor extends approximately 11 mm beyond the muscularis propria along the retrocervical region and contacts but does not definitely invade the posterior cervix (3/8.) EXTRAMURAL VASCULAR INVASION (EMVI): Present Location of EMVI (indicate series and image number): In the presacral region, along the posterior aspect of the tumor (3/15) Mesorectal Fascial (MRF) (FOR T3 ONLY): Distance between tumor and MRF: 0 mm LYMPH NODES (suspicious criteria: round shape, irregular borders, heterogeneous signal intensity): Mesorectal/Superior Rectal Lymph Nodes: N0: No visible lymph nodes/deposits Locoregional (Internal Iliac, Obturator, Inguinal below dentate line): N/A Distant (Common Iliac, External Iliac, Inguinal, Retroperitoneal): Mildly irregular left external iliac node measuring 1.0 x 0.7 cm (4/17.) She completed her antibiotics.  07/03/2024 accompanied by her daughter; Reports nausea started on 3rd day post treatment and had vomited 3 times that lasted for about 1 week. She was in ER for low grade fever and LBP, tramadol by Dr. Coronel did not work. She was d/c on Augmentin for 10 day. She feels much better now.  The patient's daughter reported the patient does not have adequate oral hydration at home.  This is the time when she felt nauseous.  07/24/2024 accompanied by her daughter Carmela and her son; Reports feeling much better after IV hydration with Zofran and Pepcid IV last week. She has no more diarrhea and/or vomiting, still with some occasional nausea.  08/21/2024 accompanied by her son; Reports feeling well at the baseline of her health status, no changes in chronic conditions or new complaints since the last visit.  09/25/2024 She is here today with her son to continue the treatment.  She mentioned that she has a very difficult situation where she may lose her insurance and will not be able to continue her cancer care.  Social work team was involved, but unfortunately unable to resolve the issue.  She will continue to work toward not losing her insurance.  She states that she feels much better after iron infusions.  10/30/2024 She is here accompanied by her daughter and her son. She is back to work on a light work schedule. She is happy to be back to work. She is feeling fine.  12/12/2024 She is here for F/U accompanied by her daughter and son. She is back to work. Still with her now chronic issues. Ready to start Chemo/RT, but not scheduled yet.  01/15/2025 accompanied by her  and daughter; Reports her pain is not getting better.  02/05/2025 She is here today for follow-up accompanied by her son and her daughter on the phone. She reports no changes in her chronic issues.

## 2025-02-11 NOTE — HISTORY OF PRESENT ILLNESS
[Disease: _____________________] : Disease: [unfilled] [T: ___] : T[unfilled] [N: ___] : N[unfilled] [M: ___] : M[unfilled] [AJCC Stage: ____] : AJCC Stage: [unfilled] [de-identified] : 58 y/o female w/ no significant PMHx who presented to [facility] on 3/30/24 complaining of rectal pain associated w/ BRBPR for past several weeks. Workup included a CT scan of the abdomen which demonstrated a low rectal 9cm long segment of mass-like thickening, suspicious for rectal cancer, associated w/ abscess and possible local perforation. Patient was transferred to [facility] for further evaluation and management.  Patient underwent IR drainage of x2 perirectal abscess (10cc purulent fluid drained from each) on 4/2. Abscess cultures were positive for ESBL E.coli. She completed 7 days of Zosyn prior to culture results. Antibiotics were changed to 1g q8h beginning on 4/9.  She is now s/p sigmoid loop colostomy creation with flexible sigmoidoscopy, and biopsies on 4/8/24 with Dr. Coronel. Intra-operatively, patient was noted to have a large necrotizing mass 6cm from anal verge. 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  On 4/12/24 she was cleared for discharge. A midline catheter was placed in the hospital. She will complete IV antibiotics (Ertapenem 1g QD) for 10 days from 4/12 to 4/22  She has been having CRP done as outpatient and they are coming down but still elevated and she was instructed to remain on IV antibiotics for another week.  CT C/A/P did not show any distant metastasis: 3/30 and 3/31/2024: No evidence of acute intrathoracic pathology  Nonspecific lobulated cystic structure within the uterine dome. This may reflect an atypical fibroid. Nonemergent pelvic ultrasound or MRI may be of benefit.  Most recent blood work from 4/24/2024 white blood cell count 6.4 hemoglobin 8.9 platelets 500,000 CMP normal LFTs albumin is 3.2 creatinine 0.7 rest of CMP is normal ferritin was 150 on 411 iron saturation 6%.  Most recent CRP 29 from 430 was 76 on 417  CA 19-9 11 CA 19-9 116 on 3/31/2024  She is under the care of Dr. Coronel. She was seen by inpatient onc team. She as presented in Tumor board. Plan is to start AMIRA with chemotherapy. [de-identified] : adenocarcinoma MMR proficent  [de-identified] : 5/15/2024 She is here for follow up. She had MR rectum:  TUMOR LOCATION: Mid to high rectal tumor Tumor location descriptor: Posterior Distance to anal verge (lowest extent of tumor): 6.2 cm Distance to top of anal sphincter complex (lowest extent of tumor): 3.1 cm Relationship to anterior peritoneal reflection: Straddles. TUMOR CHARACTERISTICS: Craniocaudal length: 8.4 cm Morphology: Polypoid Mucinous: Some mucin. T CATEGORY: T3c (tumor penetrates >5-15 mm beyond muscularis propria); tumor extends approximately 11 mm beyond the muscularis propria along the retrocervical region and contacts but does not definitely invade the posterior cervix (3/8.) EXTRAMURAL VASCULAR INVASION (EMVI): Present Location of EMVI (indicate series and image number): In the presacral region, along the posterior aspect of the tumor (3/15) Mesorectal Fascial (MRF) (FOR T3 ONLY): Distance between tumor and MRF: 0 mm LYMPH NODES (suspicious criteria: round shape, irregular borders, heterogeneous signal intensity): Mesorectal/Superior Rectal Lymph Nodes: N0: No visible lymph nodes/deposits Locoregional (Internal Iliac, Obturator, Inguinal below dentate line): N/A Distant (Common Iliac, External Iliac, Inguinal, Retroperitoneal): Mildly irregular left external iliac node measuring 1.0 x 0.7 cm (4/17.) She completed her antibiotics.  07/03/2024 accompanied by her daughter; Reports nausea started on 3rd day post treatment and had vomited 3 times that lasted for about 1 week. She was in ER for low grade fever and LBP, tramadol by Dr. Coronel did not work. She was d/c on Augmentin for 10 day. She feels much better now.  The patient's daughter reported the patient does not have adequate oral hydration at home.  This is the time when she felt nauseous.  07/24/2024 accompanied by her daughter Carmela and her son; Reports feeling much better after IV hydration with Zofran and Pepcid IV last week. She has no more diarrhea and/or vomiting, still with some occasional nausea.  08/21/2024 accompanied by her son; Reports feeling well at the baseline of her health status, no changes in chronic conditions or new complaints since the last visit.  09/25/2024 She is here today with her son to continue the treatment.  She mentioned that she has a very difficult situation where she may lose her insurance and will not be able to continue her cancer care.  Social work team was involved, but unfortunately unable to resolve the issue.  She will continue to work toward not losing her insurance.  She states that she feels much better after iron infusions.  10/30/2024 She is here accompanied by her daughter and her son. She is back to work on a light work schedule. She is happy to be back to work. She is feeling fine.  12/12/2024 She is here for F/U accompanied by her daughter and son. She is back to work. Still with her now chronic issues. Ready to start Chemo/RT, but not scheduled yet.  01/15/2025 accompanied by her  and daughter; Reports her pain is not getting better.  02/05/2025 She is here today for follow-up accompanied by her son and her daughter on the phone. She reports no changes in her chronic issues.

## 2025-02-11 NOTE — PHYSICAL EXAM
[Restricted in physically strenuous activity but ambulatory and able to carry out work of a light or sedentary nature] : Status 1- Restricted in physically strenuous activity but ambulatory and able to carry out work of a light or sedentary nature, e.g., light house work, office work [Normal] : affect appropriate [de-identified] : ostomy - looks well.

## 2025-02-11 NOTE — END OF VISIT
[FreeTextEntry3] : I was physically present for the key portions of the evaluation and management service provided.  I agree with the history and physical, and plan which I have reviewed and edited where appropriate.  Richa is here for follow-up she was recently admitted to the hospital because of pain.  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  During the hospital stay she had a nerve block and also just finished palliative radiation to the tumor and feeling much better today.  She will follow-up with pain management for pain control  We restarted her on chemotherapy CapeOx also we send out NGS, HER2 testing to see if we could add on additional treatment options  Will also arrange for liver biopsy to confirm metastatic disease  She is planning to see a different surgeon for a second opinion regarding resection  She will see us back in 3 weeks

## 2025-02-11 NOTE — PHYSICAL EXAM
[Restricted in physically strenuous activity but ambulatory and able to carry out work of a light or sedentary nature] : Status 1- Restricted in physically strenuous activity but ambulatory and able to carry out work of a light or sedentary nature, e.g., light house work, office work [Normal] : affect appropriate [de-identified] : ostomy - looks well.

## 2025-02-14 NOTE — HISTORY OF PRESENT ILLNESS
[de-identified] : Ms. ANN MERCER is a 57-year-old female who presents via telehealth for initial consultation regarding perforated rectal cancer diagnosed in 2024. 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 never had a colonoscopy.  From [place], noted to have normal blood work in 2023.    Patient initially presented to [facility] on 3/30/24 complaining of rectal pain associated w/ BRBPR for several weeks. Workup included a CT scan of the abdomen which demonstrated a low rectal 9 cm long segment of mass-like thickening, suspicious for rectal cancer, associated w/ abscess and possible local perforation. Patient was transferred to [facility] for further evaluation and management. Ca 19-9 was 116 at that time. She underwent IR drainage x2 for perirectal abscess on 2024; cultures positive for ESBL E. coli for which she was on a long course of antibiotics.  S/p sigmoid loop colostomy on 2024 with Dr. Coronel. Intra-operatively, patient was noted to have a large necrotizing mass 6cm from anal verge. 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  Underwent 4 months of systemic CapeOx on  - 10/30/2024.  Rectal MRI 2024 demonstrated partial response to treatment (4/6 cycles at that time). No new lesion. Interval decrease in size of the known mid-high rectal tumor, now 6.0 cm previously 8.4 cm, with gross invasion of the perirectal fat contacting the mesorectal fascia posteriorly/anterior peritoneal reflection. No lymphadenopathy seen.   CAP CT 2024 showed no significant interval change in large complex rectal/perirectal pelvic mass. No suspicious hepatic or intrathoracic lesions. Plan was to start concurrent chemo/RT; saw rad/onc on 2024.  PET/CT 2025 ordered by rad/onc was impressionable for the followin. Extensive FDG avid rectal mass, SUV max 14.5.  2. 1 cm FDG avid right hilar lymph node, SUV max 11.7-suspicious for biologic tumor activity.  3. No suspicious FDG avid pulmonary nodules or parenchymal lesions. No FDG avid adenopathy in the abdomen and pelvis.   After 10/30/2024, she was off chemo for 2 months, then subsequently resumed CapeOx on 1/15/2025.  Recently presented to the ED on 2025 complaining of persistent rectal pain x1 week. CT 2025 noted a new 1.7 cm hypodense lesion in the left lobe, suspicious for mets; unchanged rectal/pelvic mass measuring 10 cm. New mild left hyponephrosis/hydroureter to the level of the pelvic mass was also noted. Pulmonary was consulted; patient s/p EBUS with FNA of Hilar node on 2025 which was negative for malignant cells.  Patient s/p 5 fractions of RT  - 2025. (CEA: 178 on 25).   Patient presents via telehealth for initial consultation, surgical opinion. She reports fatigue and weakness. Her pain medication was changed from Tramadol to morphine 2 weeks ago for severe pain.

## 2025-02-14 NOTE — HISTORY OF PRESENT ILLNESS
[de-identified] : Ms. ANN MERCER is a 57-year-old female who presents via telehealth for initial consultation regarding perforated rectal cancer diagnosed in 2024. 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 never had a colonoscopy.  From [place], noted to have normal blood work in 2023.    Patient initially presented to [facility] on 3/30/24 complaining of rectal pain associated w/ BRBPR for several weeks. Workup included a CT scan of the abdomen which demonstrated a low rectal 9 cm long segment of mass-like thickening, suspicious for rectal cancer, associated w/ abscess and possible local perforation. Patient was transferred to [facility] for further evaluation and management. Ca 19-9 was 116 at that time. She underwent IR drainage x2 for perirectal abscess on 2024; cultures positive for ESBL E. coli for which she was on a long course of antibiotics.  S/p sigmoid loop colostomy on 2024 with Dr. Coronel. Intra-operatively, patient was noted to have a large necrotizing mass 6cm from anal verge. 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  Underwent 4 months of systemic CapeOx on  - 10/30/2024.  Rectal MRI 2024 demonstrated partial response to treatment (4/6 cycles at that time). No new lesion. Interval decrease in size of the known mid-high rectal tumor, now 6.0 cm previously 8.4 cm, with gross invasion of the perirectal fat contacting the mesorectal fascia posteriorly/anterior peritoneal reflection. No lymphadenopathy seen.   CAP CT 2024 showed no significant interval change in large complex rectal/perirectal pelvic mass. No suspicious hepatic or intrathoracic lesions. Plan was to start concurrent chemo/RT; saw rad/onc on 2024.  PET/CT 2025 ordered by rad/onc was impressionable for the followin. Extensive FDG avid rectal mass, SUV max 14.5.  2. 1 cm FDG avid right hilar lymph node, SUV max 11.7-suspicious for biologic tumor activity.  3. No suspicious FDG avid pulmonary nodules or parenchymal lesions. No FDG avid adenopathy in the abdomen and pelvis.   After 10/30/2024, she was off chemo for 2 months, then subsequently resumed CapeOx on 1/15/2025.  Recently presented to the ED on 2025 complaining of persistent rectal pain x1 week. CT 2025 noted a new 1.7 cm hypodense lesion in the left lobe, suspicious for mets; unchanged rectal/pelvic mass measuring 10 cm. New mild left hyponephrosis/hydroureter to the level of the pelvic mass was also noted. Pulmonary was consulted; patient s/p EBUS with FNA of Hilar node on 2025 which was negative for malignant cells.  Patient s/p 5 fractions of RT  - 2025. (CEA: 178 on 25).   Patient presents via telehealth for initial consultation, surgical opinion. She reports fatigue and weakness. Her pain medication was changed from Tramadol to morphine 2 weeks ago for severe pain.

## 2025-02-14 NOTE — ASSESSMENT
[FreeTextEntry1] : Ms. ANN MERCER is a 57-year-old female who presents via telehealth for initial consultation regarding perforated rectal cancer diagnosed in 2024.  S/p sigmoid loop colostomy on 2024 with Dr. Coronel. 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  S/p 4 months of systemic CapeOx on  - 10/30/2024. Was off chemo for 2 months then subsequently resumed CapeOx on 1/15/2025.  Rectal MRI 2024 demonstrated partial response to treatment (4/6 cycles at that time). No new lesion. CAP CT 2024 showed no significant interval change in large complex rectal/perirectal pelvic mass. No suspicious hepatic or intrathoracic lesions.  PET/CT 2025 ordered by rad/onc was impressionable for the followin. Extensive FDG avid rectal mass, SUV max 14.5.  2. 1 cm FDG avid right hilar lymph node, SUV max 11.7-suspicious for biologic tumor activity.  3. No suspicious FDG avid pulmonary nodules or parenchymal lesions. No FDG avid adenopathy in the abdomen and pelvis.   Recently presented to the ED on 2025 complaining of persistent rectal pain x1 week. CT 2025 noted a new 1.7 cm hypodense lesion in the left lobe, suspicious for mets; unchanged rectal/pelvic mass measuring 10 cm. New mild left hyponephrosis/hydroureter to the level of the pelvic mass was also noted. Pulmonary was consulted; patient s/p EBUS with FNA of Hilar node on 2025 which was negative for malignant cells.  Patient s/p 5 fractions of RT  - 2025. (CEA: 178 on 25).   Patient presents via telehealth for initial consultation, surgical opinion. I reviewed all of her past and recent imaging studies. She was off of therapy for 2 months after 2024, resumed 1/15/25 and in the interim she developed a liver lesion suspicious for mets. I discussed that normally after radiation therapy we re-image the tumor in 6-8 weeks with a rectal MRI; will additionally plan to get a liver MRI. At that point can consider resection of her primary tumor as well as the liver lesion, likely in conjunction with colorectal surg Dr. Lucia- imaging will be shared with him. We discussed technical aspects and prognosis of surgical treatment. She is currently back on systemic treatment so I believe it is safe to wait another 6-8 weeks to assess RT effectiveness. We will plan to see patient back in 6-8 after rectal and liver MRIs at that time and will additionally discuss case with Dr. Lucia.  Patient and daughter asked many great questions, which I answered to the best of my ability.  pending liver bx.  Today, I personally spent 60 minutes in total time including reviewing imaging and studies, discussing complex treatment regimens, direct face to face time with the patient, patient education and counseling.

## 2025-02-14 NOTE — HISTORY OF PRESENT ILLNESS
[de-identified] : Ms. ANN MERCER is a 57-year-old female who presents via telehealth for initial consultation regarding perforated rectal cancer diagnosed in 2024. 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 never had a colonoscopy.  From [place], noted to have normal blood work in 2023.    Patient initially presented to [facility] on 3/30/24 complaining of rectal pain associated w/ BRBPR for several weeks. Workup included a CT scan of the abdomen which demonstrated a low rectal 9 cm long segment of mass-like thickening, suspicious for rectal cancer, associated w/ abscess and possible local perforation. Patient was transferred to [facility] for further evaluation and management. Ca 19-9 was 116 at that time. She underwent IR drainage x2 for perirectal abscess on 2024; cultures positive for ESBL E. coli for which she was on a long course of antibiotics.  S/p sigmoid loop colostomy on 2024 with Dr. Coronel. Intra-operatively, patient was noted to have a large necrotizing mass 6cm from anal verge. 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  Underwent 4 months of systemic CapeOx on  - 10/30/2024.  Rectal MRI 2024 demonstrated partial response to treatment (4/6 cycles at that time). No new lesion. Interval decrease in size of the known mid-high rectal tumor, now 6.0 cm previously 8.4 cm, with gross invasion of the perirectal fat contacting the mesorectal fascia posteriorly/anterior peritoneal reflection. No lymphadenopathy seen.   CAP CT 2024 showed no significant interval change in large complex rectal/perirectal pelvic mass. No suspicious hepatic or intrathoracic lesions. Plan was to start concurrent chemo/RT; saw rad/onc on 2024.  PET/CT 2025 ordered by rad/onc was impressionable for the followin. Extensive FDG avid rectal mass, SUV max 14.5.  2. 1 cm FDG avid right hilar lymph node, SUV max 11.7-suspicious for biologic tumor activity.  3. No suspicious FDG avid pulmonary nodules or parenchymal lesions. No FDG avid adenopathy in the abdomen and pelvis.   After 10/30/2024, she was off chemo for 2 months, then subsequently resumed CapeOx on 1/15/2025.  Recently presented to the ED on 2025 complaining of persistent rectal pain x1 week. CT 2025 noted a new 1.7 cm hypodense lesion in the left lobe, suspicious for mets; unchanged rectal/pelvic mass measuring 10 cm. New mild left hyponephrosis/hydroureter to the level of the pelvic mass was also noted. Pulmonary was consulted; patient s/p EBUS with FNA of Hilar node on 2025 which was negative for malignant cells.  Patient s/p 5 fractions of RT  - 2025. (CEA: 178 on 25).   Patient presents via telehealth for initial consultation, surgical opinion. She reports fatigue and weakness. Her pain medication was changed from Tramadol to morphine 2 weeks ago for severe pain.

## 2025-02-14 NOTE — HISTORY OF PRESENT ILLNESS
[de-identified] : Ms. ANN MERCER is a 57-year-old female who presents via telehealth for initial consultation regarding perforated rectal cancer diagnosed in 2024. 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 never had a colonoscopy.  From [place], noted to have normal blood work in 2023.    Patient initially presented to [facility] on 3/30/24 complaining of rectal pain associated w/ BRBPR for several weeks. Workup included a CT scan of the abdomen which demonstrated a low rectal 9 cm long segment of mass-like thickening, suspicious for rectal cancer, associated w/ abscess and possible local perforation. Patient was transferred to [facility] for further evaluation and management. Ca 19-9 was 116 at that time. She underwent IR drainage x2 for perirectal abscess on 2024; cultures positive for ESBL E. coli for which she was on a long course of antibiotics.  S/p sigmoid loop colostomy on 2024 with Dr. Coronel. Intra-operatively, patient was noted to have a large necrotizing mass 6cm from anal verge. 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  Underwent 4 months of systemic CapeOx on  - 10/30/2024.  Rectal MRI 2024 demonstrated partial response to treatment (4/6 cycles at that time). No new lesion. Interval decrease in size of the known mid-high rectal tumor, now 6.0 cm previously 8.4 cm, with gross invasion of the perirectal fat contacting the mesorectal fascia posteriorly/anterior peritoneal reflection. No lymphadenopathy seen.   CAP CT 2024 showed no significant interval change in large complex rectal/perirectal pelvic mass. No suspicious hepatic or intrathoracic lesions. Plan was to start concurrent chemo/RT; saw rad/onc on 2024.  PET/CT 2025 ordered by rad/onc was impressionable for the followin. Extensive FDG avid rectal mass, SUV max 14.5.  2. 1 cm FDG avid right hilar lymph node, SUV max 11.7-suspicious for biologic tumor activity.  3. No suspicious FDG avid pulmonary nodules or parenchymal lesions. No FDG avid adenopathy in the abdomen and pelvis.   After 10/30/2024, she was off chemo for 2 months, then subsequently resumed CapeOx on 1/15/2025.  Recently presented to the ED on 2025 complaining of persistent rectal pain x1 week. CT 2025 noted a new 1.7 cm hypodense lesion in the left lobe, suspicious for mets; unchanged rectal/pelvic mass measuring 10 cm. New mild left hyponephrosis/hydroureter to the level of the pelvic mass was also noted. Pulmonary was consulted; patient s/p EBUS with FNA of Hilar node on 2025 which was negative for malignant cells.  Patient s/p 5 fractions of RT  - 2025. (CEA: 178 on 25).   Patient presents via telehealth for initial consultation, surgical opinion. She reports fatigue and weakness. Her pain medication was changed from Tramadol to morphine 2 weeks ago for severe pain.

## 2025-02-14 NOTE — END OF VISIT
[FreeTextEntry3] : By signing my name below, I, Meamarilysnajma Zuniga, attest that this documentation has been prepared under the direction and in the presence of Raoul Wells MD in the following sections HISTORY OF PRESENT ILLNESS; PAST MEDICAL/FAMILY/SOCIAL HISTORY; REVIEW OF SYSTEMS; PHYSICAL EXAM; ASSESSMENT/PLAN.

## 2025-02-25 NOTE — HISTORY OF PRESENT ILLNESS
[FreeTextEntry1] : ----59 Y/O here c/o vaginal dc and itching;-nvfc. PMHX;/RECTAL CANCER;CHEM AND RT THEN SURGERY PSHX;/COLOSTOMY SOCIAL HX;-ETOH -CIGG  STD;   /        STD PREVENTION DISCUSSED FAMILY HISTORY OF BREAST CANCER;NO REVIEW OF SYMPTOMS DONE; ALLERGIES; pt answered NKDA Medication reconciliation was completed by reviewing, with the patient's involvement, the patient's current outpatient medications and those  ordered for the patient today.           PE; ABDOMEN;SOFT NT ND VAGINA THICK WHITE DC EXT -CCE   A;P;VAGINITIS -BD AFFIRM -DIFLUCAN 150 X 1 -FLAGYL 500 BID X 7 DAYS -ROD -F-U AFTER ABOVE.

## 2025-02-28 NOTE — PHYSICAL EXAM
[Restricted in physically strenuous activity but ambulatory and able to carry out work of a light or sedentary nature] : Status 1- Restricted in physically strenuous activity but ambulatory and able to carry out work of a light or sedentary nature, e.g., light house work, office work [Normal] : affect appropriate [de-identified] : ostomy - looks well.

## 2025-02-28 NOTE — ASSESSMENT
[Medication(s)] : Medication(s) [Curative] : Goals of care discussed with patient: Curative [FreeTextEntry1] : # Perforated rectal cancer, adenocarcinoma. - MMRp. - 04/08/2024 s/p sigmoid loop colostomy, was on IV antibiotics. - no metastasis on CT C/A/P. - Rectal MRI for staging showed bulky disease 8.4 cm, T3C N0 - start FOLFOX, but unfortunately PORT was not placed and is pending on Friday, she is feeling well. Plan was to add irinotecan in the future, but she is very hesitant about FOLFIRINOX and we had an extensive discussion I explained the possible benefits of additional irinotecan, but decided to proceed with just CapeOx for 3 to 4 months of treatment will aim for 6 cycles followed by long course chemoradiation. - We spoke about side effects of oxaliplatin before we spoke with capecitabine today which included rash as well as diarrhea rarely DPD deficiency severe mucositis and she is aware of approximately 1% chance of death - 05/06/2024 MR pelvis/rectal/anal - Status post diverting sigmoid loop colostomy. Small fat-containing paraumbilical hernia. A 3.2 x 3.0 x 2.3 cm mixed cystic and solid lesion within the central portion of the uterus possibly reflecting a submucosal fibroid or adenomyoma protruding into the endometrial cavity versus a true endometrial lesion. IMPRESSION: 1. Mid to high rectal tumor measuring 8.4 cm straddles the anterior peritoneal reflection, with evidence of EMVI; tumor extends beyond muscularis propria along the retrocervical region and contacts but does not definitely invade the posterior cervix. T category: T3c; 2. Mildly irregular 1.0 cm left external iliac lymph node. 3. A 3.2 cm mixed cystic and solid lesion within the central portion of the uterus was likely present dating back to August 2019, possibly reflecting a submucosal fibroid or adenomyoma protruding into the endometrial cavity; GYN consultation may be of use. - 06/11/2024 started CapeOx as Oxaliplatin and capecitabine 14/21D Q21D. - 06/16/2024 CT A/P - Unchanged oncologic findings as above with large perirectal mass and abscess now contains a small focus of air. Stable resulting right hydronephrosis from obstructing the distal ureter. No emergent findings identified. Refer to prior report from 5/21/2024 for further details. - 07/03/2024 capecitabine dose reduction to 1000 mg PO BID 14/21D due to nausea and vomiting that resolved post IV hydration with Zofran and Pepcid IV. - 09/23/2024 MR Rectal - Since the prior examination on 5/6/2024, the primary tumor shows: Partial treatment response (status post 4 of 6 cycles of chemotherapy). No new lesions. Interval decrease in size of the known mid to high rectal tumor now measuring 6.0 cm in craniocaudal dimension, previously 8.4 cm. There remains gross invasion of the perirectal fat contacting the mesorectal fascia posteriorly (8/23) and the anterior peritoneal reflection (8/13). No lymphadenopathy. - 10/30/2024 completed 6 cycles of CapeOx as Oxaliplatin 130 mg/m2 with 20% dose reduction = 104 mg/m2 = 145 mg IV and capecitabine 1000 mg BID 14/21D. - 11/22/2024 CT C/A/P - Since abdominal CT scan of June 16, 2024, Left lower quadrant colostomy device. No bowel obstruction or ascites. No significant interval change in large complex rectal/perirectal pelvic mass. No suspicious hepatic or intrathoracic lesions. - 01/08/2025 PET - Extensive FDG avid rectal mass, SUV max 14.5. 1 cm FDG avid right hilar lymph node, SUV max 11.7-suspicious for biologic tumor activity. No suspicious FDG avid pulmonary nodules or parenchymal lesions. No FDG avid adenopathy in the abdomen and pelvis. As she is no longer a candidate for RT, will arrange a possible EBUS reached out to pulmonary. In the interim will restart CapOx as she has more rectal pain and she has not had chemotherapy since essentially end of October. - 01/15/2025 restarted CapeOx as Oxaliplatin 104 mg/m2 = 145 mg IV D1 and capecitabine 1000 mg BID D1-14 Q21D.  02/05/2025 Labs reviewed and results discussed with the patient and her son with her daughter on the phone; She is planning to see Dr. Wells for second opinion about The possibility of surgery - remains clinically stable to continue current management. All questions were answered to satisfaction.  Richa is here for follow-up she was recently admitted to the hospital because of pain.  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  During the hospital stay she had a nerve block and also just finished palliative radiation to the tumor and feeling much better today.  She will follow-up with pain management for pain control  We restarted her on chemotherapy CapeOx also we send out NGS, HER2 testing to see if we could add on additional treatment options  Will also arrange for liver biopsy to confirm metastatic disease  She is planning to see a different surgeon for a second opinion regarding resection  02/26/2025 Labs reviewed and results discussed with the patient and her family; she reports increasing fatigue - remains clinically stable to continue current management. All questions were answered to satisfaction.  PLAN: - continue Oxaliplatin 104 mg/m2 = 145 mg IV D1 and capecitabine 1000 mg BID D1-14 Q21D - C3 GIVE TODAY. - continue Tramadol 100 mg PO Q8H PRN for pain - She is now follow-up by pain management team for all her pain management issues and adjustments. - repeat PET in 3 months - 04/2025. - Labs today: CBC CMP Mg - Will order CEA level as per Dr. Wells request. RTC in 3 weeks with CBC CMP Mg and same day treatment. Carmela (daughter) [phone]

## 2025-02-28 NOTE — ASSESSMENT
[Medication(s)] : Medication(s) [Curative] : Goals of care discussed with patient: Curative [FreeTextEntry1] : # Perforated rectal cancer, adenocarcinoma. - MMRp. - 04/08/2024 s/p sigmoid loop colostomy, was on IV antibiotics. - no metastasis on CT C/A/P. - Rectal MRI for staging showed bulky disease 8.4 cm, T3C N0 - start FOLFOX, but unfortunately PORT was not placed and is pending on Friday, she is feeling well. Plan was to add irinotecan in the future, but she is very hesitant about FOLFIRINOX and we had an extensive discussion I explained the possible benefits of additional irinotecan, but decided to proceed with just CapeOx for 3 to 4 months of treatment will aim for 6 cycles followed by long course chemoradiation. - We spoke about side effects of oxaliplatin before we spoke with capecitabine today which included rash as well as diarrhea rarely DPD deficiency severe mucositis and she is aware of approximately 1% chance of death - 05/06/2024 MR pelvis/rectal/anal - Status post diverting sigmoid loop colostomy. Small fat-containing paraumbilical hernia. A 3.2 x 3.0 x 2.3 cm mixed cystic and solid lesion within the central portion of the uterus possibly reflecting a submucosal fibroid or adenomyoma protruding into the endometrial cavity versus a true endometrial lesion. IMPRESSION: 1. Mid to high rectal tumor measuring 8.4 cm straddles the anterior peritoneal reflection, with evidence of EMVI; tumor extends beyond muscularis propria along the retrocervical region and contacts but does not definitely invade the posterior cervix. T category: T3c; 2. Mildly irregular 1.0 cm left external iliac lymph node. 3. A 3.2 cm mixed cystic and solid lesion within the central portion of the uterus was likely present dating back to August 2019, possibly reflecting a submucosal fibroid or adenomyoma protruding into the endometrial cavity; GYN consultation may be of use. - 06/11/2024 started CapeOx as Oxaliplatin and capecitabine 14/21D Q21D. - 06/16/2024 CT A/P - Unchanged oncologic findings as above with large perirectal mass and abscess now contains a small focus of air. Stable resulting right hydronephrosis from obstructing the distal ureter. No emergent findings identified. Refer to prior report from 5/21/2024 for further details. - 07/03/2024 capecitabine dose reduction to 1000 mg PO BID 14/21D due to nausea and vomiting that resolved post IV hydration with Zofran and Pepcid IV. - 09/23/2024 MR Rectal - Since the prior examination on 5/6/2024, the primary tumor shows: Partial treatment response (status post 4 of 6 cycles of chemotherapy). No new lesions. Interval decrease in size of the known mid to high rectal tumor now measuring 6.0 cm in craniocaudal dimension, previously 8.4 cm. There remains gross invasion of the perirectal fat contacting the mesorectal fascia posteriorly (8/23) and the anterior peritoneal reflection (8/13). No lymphadenopathy. - 10/30/2024 completed 6 cycles of CapeOx as Oxaliplatin 130 mg/m2 with 20% dose reduction = 104 mg/m2 = 145 mg IV and capecitabine 1000 mg BID 14/21D. - 11/22/2024 CT C/A/P - Since abdominal CT scan of June 16, 2024, Left lower quadrant colostomy device. No bowel obstruction or ascites. No significant interval change in large complex rectal/perirectal pelvic mass. No suspicious hepatic or intrathoracic lesions. - 01/08/2025 PET - Extensive FDG avid rectal mass, SUV max 14.5. 1 cm FDG avid right hilar lymph node, SUV max 11.7-suspicious for biologic tumor activity. No suspicious FDG avid pulmonary nodules or parenchymal lesions. No FDG avid adenopathy in the abdomen and pelvis. As she is no longer a candidate for RT, will arrange a possible EBUS reached out to pulmonary. In the interim will restart CapOx as she has more rectal pain and she has not had chemotherapy since essentially end of October. - 01/15/2025 restarted CapeOx as Oxaliplatin 104 mg/m2 = 145 mg IV D1 and capecitabine 1000 mg BID D1-14 Q21D.  02/05/2025 Labs reviewed and results discussed with the patient and her son with her daughter on the phone; She is planning to see Dr. Wells for second opinion about The possibility of surgery - remains clinically stable to continue current management. All questions were answered to satisfaction.  Richa is here for follow-up she was recently admitted to the hospital because of pain.  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  During the hospital stay she had a nerve block and also just finished palliative radiation to the tumor and feeling much better today.  She will follow-up with pain management for pain control  We restarted her on chemotherapy CapeOx also we send out NGS, HER2 testing to see if we could add on additional treatment options  Will also arrange for liver biopsy to confirm metastatic disease  She is planning to see a different surgeon for a second opinion regarding resection  02/26/2025 Labs reviewed and results discussed with the patient and her family; she reports increasing fatigue - remains clinically stable to continue current management. All questions were answered to satisfaction.  PLAN: - continue Oxaliplatin 104 mg/m2 = 145 mg IV D1 and capecitabine 1000 mg BID D1-14 Q21D - C3 GIVE TODAY. - continue Tramadol 100 mg PO Q8H PRN for pain - She is now follow-up by pain management team for all her pain management issues and adjustments. - repeat PET in 3 months - 04/2025. - Labs today: CBC CMP Mg - Will order CEA level as per Dr. Wells request. RTC in 3 weeks with CBC CMP Mg and same day treatment. Carmela (daughter) [phone]

## 2025-02-28 NOTE — PHYSICAL EXAM
[Restricted in physically strenuous activity but ambulatory and able to carry out work of a light or sedentary nature] : Status 1- Restricted in physically strenuous activity but ambulatory and able to carry out work of a light or sedentary nature, e.g., light house work, office work [Normal] : affect appropriate [de-identified] : ostomy - looks well.

## 2025-02-28 NOTE — HISTORY OF PRESENT ILLNESS
[Disease: _____________________] : Disease: [unfilled] [T: ___] : T[unfilled] [N: ___] : N[unfilled] [M: ___] : M[unfilled] [AJCC Stage: ____] : AJCC Stage: [unfilled] [de-identified] : 56 y/o female w/ no significant PMHx who presented to [facility] on 3/30/24 complaining of rectal pain associated w/ BRBPR for past several weeks. Workup included a CT scan of the abdomen which demonstrated a low rectal 9cm long segment of mass-like thickening, suspicious for rectal cancer, associated w/ abscess and possible local perforation. Patient was transferred to [facility] for further evaluation and management.  Patient underwent IR drainage of x2 perirectal abscess (10cc purulent fluid drained from each) on 4/2. Abscess cultures were positive for ESBL E.coli. She completed 7 days of Zosyn prior to culture results. Antibiotics were changed to 1g q8h beginning on 4/9.  She is now s/p sigmoid loop colostomy creation with flexible sigmoidoscopy, and biopsies on 4/8/24 with Dr. Coronel. Intra-operatively, patient was noted to have a large necrotizing mass 6cm from anal verge. 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  On 4/12/24 she was cleared for discharge. A midline catheter was placed in the hospital. She will complete IV antibiotics (Ertapenem 1g QD) for 10 days from 4/12 to 4/22  She has been having CRP done as outpatient and they are coming down but still elevated and she was instructed to remain on IV antibiotics for another week.  CT C/A/P did not show any distant metastasis: 3/30 and 3/31/2024: No evidence of acute intrathoracic pathology  Nonspecific lobulated cystic structure within the uterine dome. This may reflect an atypical fibroid. Nonemergent pelvic ultrasound or MRI may be of benefit.  Most recent blood work from 4/24/2024 white blood cell count 6.4 hemoglobin 8.9 platelets 500,000 CMP normal LFTs albumin is 3.2 creatinine 0.7 rest of CMP is normal ferritin was 150 on 411 iron saturation 6%.  Most recent CRP 29 from 430 was 76 on 417  CA 19-9 11 CA 19-9 116 on 3/31/2024  She is under the care of Dr. Coronel. She was seen by inpatient onc team. She as presented in Tumor board. Plan is to start AMIRA with chemotherapy. [de-identified] : adenocarcinoma MMR proficent  [de-identified] : 5/15/2024 She is here for follow up. She had MR rectum:  TUMOR LOCATION: Mid to high rectal tumor Tumor location descriptor: Posterior Distance to anal verge (lowest extent of tumor): 6.2 cm Distance to top of anal sphincter complex (lowest extent of tumor): 3.1 cm Relationship to anterior peritoneal reflection: Straddles. TUMOR CHARACTERISTICS: Craniocaudal length: 8.4 cm Morphology: Polypoid Mucinous: Some mucin. T CATEGORY: T3c (tumor penetrates >5-15 mm beyond muscularis propria); tumor extends approximately 11 mm beyond the muscularis propria along the retrocervical region and contacts but does not definitely invade the posterior cervix (3/8.) EXTRAMURAL VASCULAR INVASION (EMVI): Present Location of EMVI (indicate series and image number): In the presacral region, along the posterior aspect of the tumor (3/15) Mesorectal Fascial (MRF) (FOR T3 ONLY): Distance between tumor and MRF: 0 mm LYMPH NODES (suspicious criteria: round shape, irregular borders, heterogeneous signal intensity): Mesorectal/Superior Rectal Lymph Nodes: N0: No visible lymph nodes/deposits Locoregional (Internal Iliac, Obturator, Inguinal below dentate line): N/A Distant (Common Iliac, External Iliac, Inguinal, Retroperitoneal): Mildly irregular left external iliac node measuring 1.0 x 0.7 cm (4/17.) She completed her antibiotics.  07/03/2024 accompanied by her daughter; Reports nausea started on 3rd day post treatment and had vomited 3 times that lasted for about 1 week. She was in ER for low grade fever and LBP, tramadol by Dr. Coronel did not work. She was d/c on Augmentin for 10 day. She feels much better now.  The patient's daughter reported the patient does not have adequate oral hydration at home.  This is the time when she felt nauseous.  07/24/2024 accompanied by her daughter Carmela and her son; Reports feeling much better after IV hydration with Zofran and Pepcid IV last week. She has no more diarrhea and/or vomiting, still with some occasional nausea.  08/21/2024 accompanied by her son; Reports feeling well at the baseline of her health status, no changes in chronic conditions or new complaints since the last visit.  09/25/2024 She is here today with her son to continue the treatment.  She mentioned that she has a very difficult situation where she may lose her insurance and will not be able to continue her cancer care.  Social work team was involved, but unfortunately unable to resolve the issue.  She will continue to work toward not losing her insurance.  She states that she feels much better after iron infusions.  10/30/2024 She is here accompanied by her daughter and her son. She is back to work on a light work schedule. She is happy to be back to work. She is feeling fine.  12/12/2024 She is here for F/U accompanied by her daughter and son. She is back to work. Still with her now chronic issues. Ready to start Chemo/RT, but not scheduled yet.  01/15/2025 accompanied by her  and daughter; Reports her pain is not getting better.  02/05/2025 She is here today for follow-up accompanied by her son and her daughter on the phone. She reports no changes in her chronic issues.

## 2025-02-28 NOTE — HISTORY OF PRESENT ILLNESS
[Disease: _____________________] : Disease: [unfilled] [T: ___] : T[unfilled] [N: ___] : N[unfilled] [M: ___] : M[unfilled] [AJCC Stage: ____] : AJCC Stage: [unfilled] [de-identified] : 56 y/o female w/ no significant PMHx who presented to [facility] on 3/30/24 complaining of rectal pain associated w/ BRBPR for past several weeks. Workup included a CT scan of the abdomen which demonstrated a low rectal 9cm long segment of mass-like thickening, suspicious for rectal cancer, associated w/ abscess and possible local perforation. Patient was transferred to [facility] for further evaluation and management.  Patient underwent IR drainage of x2 perirectal abscess (10cc purulent fluid drained from each) on 4/2. Abscess cultures were positive for ESBL E.coli. She completed 7 days of Zosyn prior to culture results. Antibiotics were changed to 1g q8h beginning on 4/9.  She is now s/p sigmoid loop colostomy creation with flexible sigmoidoscopy, and biopsies on 4/8/24 with Dr. Coronel. Intra-operatively, patient was noted to have a large necrotizing mass 6cm from anal verge. 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  On 4/12/24 she was cleared for discharge. A midline catheter was placed in the hospital. She will complete IV antibiotics (Ertapenem 1g QD) for 10 days from 4/12 to 4/22  She has been having CRP done as outpatient and they are coming down but still elevated and she was instructed to remain on IV antibiotics for another week.  CT C/A/P did not show any distant metastasis: 3/30 and 3/31/2024: No evidence of acute intrathoracic pathology  Nonspecific lobulated cystic structure within the uterine dome. This may reflect an atypical fibroid. Nonemergent pelvic ultrasound or MRI may be of benefit.  Most recent blood work from 4/24/2024 white blood cell count 6.4 hemoglobin 8.9 platelets 500,000 CMP normal LFTs albumin is 3.2 creatinine 0.7 rest of CMP is normal ferritin was 150 on 411 iron saturation 6%.  Most recent CRP 29 from 430 was 76 on 417  CA 19-9 11 CA 19-9 116 on 3/31/2024  She is under the care of Dr. Coronel. She was seen by inpatient onc team. She as presented in Tumor board. Plan is to start AMIRA with chemotherapy. [de-identified] : adenocarcinoma MMR proficent  [de-identified] : 5/15/2024 She is here for follow up. She had MR rectum:  TUMOR LOCATION: Mid to high rectal tumor Tumor location descriptor: Posterior Distance to anal verge (lowest extent of tumor): 6.2 cm Distance to top of anal sphincter complex (lowest extent of tumor): 3.1 cm Relationship to anterior peritoneal reflection: Straddles. TUMOR CHARACTERISTICS: Craniocaudal length: 8.4 cm Morphology: Polypoid Mucinous: Some mucin. T CATEGORY: T3c (tumor penetrates >5-15 mm beyond muscularis propria); tumor extends approximately 11 mm beyond the muscularis propria along the retrocervical region and contacts but does not definitely invade the posterior cervix (3/8.) EXTRAMURAL VASCULAR INVASION (EMVI): Present Location of EMVI (indicate series and image number): In the presacral region, along the posterior aspect of the tumor (3/15) Mesorectal Fascial (MRF) (FOR T3 ONLY): Distance between tumor and MRF: 0 mm LYMPH NODES (suspicious criteria: round shape, irregular borders, heterogeneous signal intensity): Mesorectal/Superior Rectal Lymph Nodes: N0: No visible lymph nodes/deposits Locoregional (Internal Iliac, Obturator, Inguinal below dentate line): N/A Distant (Common Iliac, External Iliac, Inguinal, Retroperitoneal): Mildly irregular left external iliac node measuring 1.0 x 0.7 cm (4/17.) She completed her antibiotics.  07/03/2024 accompanied by her daughter; Reports nausea started on 3rd day post treatment and had vomited 3 times that lasted for about 1 week. She was in ER for low grade fever and LBP, tramadol by Dr. Coronel did not work. She was d/c on Augmentin for 10 day. She feels much better now.  The patient's daughter reported the patient does not have adequate oral hydration at home.  This is the time when she felt nauseous.  07/24/2024 accompanied by her daughter Carmela and her son; Reports feeling much better after IV hydration with Zofran and Pepcid IV last week. She has no more diarrhea and/or vomiting, still with some occasional nausea.  08/21/2024 accompanied by her son; Reports feeling well at the baseline of her health status, no changes in chronic conditions or new complaints since the last visit.  09/25/2024 She is here today with her son to continue the treatment.  She mentioned that she has a very difficult situation where she may lose her insurance and will not be able to continue her cancer care.  Social work team was involved, but unfortunately unable to resolve the issue.  She will continue to work toward not losing her insurance.  She states that she feels much better after iron infusions.  10/30/2024 She is here accompanied by her daughter and her son. She is back to work on a light work schedule. She is happy to be back to work. She is feeling fine.  12/12/2024 She is here for F/U accompanied by her daughter and son. She is back to work. Still with her now chronic issues. Ready to start Chemo/RT, but not scheduled yet.  01/15/2025 accompanied by her  and daughter; Reports her pain is not getting better.  02/05/2025 She is here today for follow-up accompanied by her son and her daughter on the phone. She reports no changes in her chronic issues.

## 2025-03-04 NOTE — ASSESSMENT
[FreeTextEntry1] : 58 y/o F with pmhx of rectal adenocarcinoma s/p chemotherapy and currently undergoing radiation referred to me for evaluation and management of cancer-associated pain. I have seen this patient as an inpatient in the hospital recently and performed a ganglion impar block for her severe rectal pain with >50% relief. Additionally, patient is currently taking prescribed Morphine ER, Morphine IR, acetaminophen and ibuprofen. She states that her pain is currently well controlled. Is here for medication refill.   #Cancer-associated pain -Patient's pain well controlled on current regimen. -Continue Morphine ER 15 mg q8hrs JANELL. -Continue Morphine IR 15 mg q6hrs PRN severe breakthrough pain. -Acetaminophen 975 mg PO q8hrs PRN  -Ibuprofen 400 mg PO q8hrs PRN -If patient's pain worsens as underlying disease progresses, will consider neurolytic ganglion impar block as regular ganglion impar block provided significant relief.  -Follow up in a month.

## 2025-03-04 NOTE — HISTORY OF PRESENT ILLNESS
[FreeTextEntry1] : 56 y/o F with pmhx of rectal adenocarcinoma s/p chemotherapy and currently undergoing radiation referred to me for evaluation and management of cancer-associated pain. I have seen this patient as an inpatient in the hospital recently and performed a ganglion impar block for her severe rectal pain with >50% relief. Additionally, patient is currently taking prescribed Morphine ER, Morphine IR, acetaminophen and ibuprofen. She states that her pain is currently well controlled. Is here for medication refill.   Interval HX (2/28/25): Pain well controlled at this time. Needs refill today. [___ yrs] : [unfilled] year(s) ago [Constant] : constant [4] : an average pain level of 4/10 [1] : a minimum pain level of 1/10 [8] : a maximum pain level of 8/10 [Sharp] : sharp [Aching] : aching [Throbbing] : throbbing [Sitting] : sitting [Transitioning] : transitioning [Laying] : laying [FreeTextEntry7] : Rectal pain

## 2025-03-17 NOTE — HISTORY OF PRESENT ILLNESS
[FreeTextEntry1] : ----57 Y/O here  STILL c/o vaginal dc and itching;-nvfc. PMHX;/RECTAL CANCER;CHEM AND RT THEN SURGERY PSHX;/COLOSTOMY SOCIAL HX;-ETOH -CIGG  STD;   /        STD PREVENTION DISCUSSED FAMILY HISTORY OF BREAST CANCER;NO REVIEW OF SYMPTOMS DONE; ALLERGIES; pt answered NKDA Medication reconciliation was completed by reviewing, with the patient's involvement, the patient's current outpatient medications and those  ordered for the patient today.           PE; ABDOMEN;SOFT NT ND VAGINA THICK WHITE DC EXT -CCE   A;P;VAGINITIS -BD AFFIRM -DIFLUCAN 150 X 1 -FLAGYL 500 BID X 7 DAYS -F-U AFTER ABOVE.

## 2025-03-20 NOTE — PHYSICAL EXAM
[Restricted in physically strenuous activity but ambulatory and able to carry out work of a light or sedentary nature] : Status 1- Restricted in physically strenuous activity but ambulatory and able to carry out work of a light or sedentary nature, e.g., light house work, office work [Normal] : affect appropriate [de-identified] : ostomy - looks well.

## 2025-03-20 NOTE — ASSESSMENT
[Medication(s)] : Medication(s) [Curative] : Goals of care discussed with patient: Curative [FreeTextEntry1] : # Perforated rectal cancer, adenocarcinoma. - MMRp. - 04/08/2024 s/p sigmoid loop colostomy, was on IV antibiotics. - no metastasis on CT C/A/P. - Rectal MRI for staging showed bulky disease 8.4 cm, T3C N0 - start FOLFOX, but unfortunately PORT was not placed and is pending on Friday, she is feeling well. Plan was to add irinotecan in the future, but she is very hesitant about FOLFIRINOX and we had an extensive discussion I explained the possible benefits of additional irinotecan, but decided to proceed with just CapeOx for 3 to 4 months of treatment will aim for 6 cycles followed by long course chemoradiation. - We spoke about side effects of oxaliplatin before we spoke with capecitabine today which included rash as well as diarrhea rarely DPD deficiency severe mucositis and she is aware of approximately 1% chance of death - 05/06/2024 MR pelvis/rectal/anal - Status post diverting sigmoid loop colostomy. Small fat-containing paraumbilical hernia. A 3.2 x 3.0 x 2.3 cm mixed cystic and solid lesion within the central portion of the uterus possibly reflecting a submucosal fibroid or adenomyoma protruding into the endometrial cavity versus a true endometrial lesion. IMPRESSION: 1. Mid to high rectal tumor measuring 8.4 cm straddles the anterior peritoneal reflection, with evidence of EMVI; tumor extends beyond muscularis propria along the retrocervical region and contacts but does not definitely invade the posterior cervix. T category: T3c; 2. Mildly irregular 1.0 cm left external iliac lymph node. 3. A 3.2 cm mixed cystic and solid lesion within the central portion of the uterus was likely present dating back to August 2019, possibly reflecting a submucosal fibroid or adenomyoma protruding into the endometrial cavity; GYN consultation may be of use. - 06/11/2024 started CapeOx as Oxaliplatin and capecitabine 14/21D Q21D. - 06/16/2024 CT A/P - Unchanged oncologic findings as above with large perirectal mass and abscess now contains a small focus of air. Stable resulting right hydronephrosis from obstructing the distal ureter. No emergent findings identified. Refer to prior report from 5/21/2024 for further details. - 07/03/2024 capecitabine dose reduction to 1000 mg PO BID 14/21D due to nausea and vomiting that resolved post IV hydration with Zofran and Pepcid IV. - 09/23/2024 MR Rectal - Since the prior examination on 5/6/2024, the primary tumor shows: Partial treatment response (status post 4 of 6 cycles of chemotherapy). No new lesions. Interval decrease in size of the known mid to high rectal tumor now measuring 6.0 cm in craniocaudal dimension, previously 8.4 cm. There remains gross invasion of the perirectal fat contacting the mesorectal fascia posteriorly (8/23) and the anterior peritoneal reflection (8/13). No lymphadenopathy. - 10/30/2024 completed 6 cycles of CapeOx as Oxaliplatin 130 mg/m2 with 20% dose reduction = 104 mg/m2 = 145 mg IV and capecitabine 1000 mg BID 14/21D. - 11/22/2024 CT C/A/P - Since abdominal CT scan of June 16, 2024, Left lower quadrant colostomy device. No bowel obstruction or ascites. No significant interval change in large complex rectal/perirectal pelvic mass. No suspicious hepatic or intrathoracic lesions. - 01/08/2025 PET - Extensive FDG avid rectal mass, SUV max 14.5. 1 cm FDG avid right hilar lymph node, SUV max 11.7-suspicious for biologic tumor activity. No suspicious FDG avid pulmonary nodules or parenchymal lesions. No FDG avid adenopathy in the abdomen and pelvis.  - As she is no longer a candidate for RT, will arrange a possible EBUS reached out to pulmonary. In the interim will restart CapOx as she has more rectal pain and she has not had chemotherapy since essentially end of October. - 01/15/2025 - 02/26/2025 restarted CapeOx as Oxaliplatin 104 mg/m2 = 145 mg IV D1 and capecitabine 1000 mg BID D1-14 Q21D - progressed after 3 cycles. - Biopsy of the hilar lymph node came back as a granuloma, unfortunately CAT scans of the abdomen pelvis showed liver metastases which she is pending a biopsy.  During the hospital stay she had a nerve block and also just finished palliative radiation to the tumor. - 02/12/2025 HER2 - NEGATIVE. - 03/17/2025 MR rectum - Since prior MRI dated 9/23/2024 1. Increased size of the large heterogeneous mid to high rectal tumor measuring up to 9.2 cm in length, previously 6 cm with increased areas of tumor necrosis; new direct tumor invasion into the vagina. Findings are compatible with T4b disease. 2. New loss of the presacral fat plane with signal abnormality and enhancement of S3-S4 segments, suspicious for tumor invasion. MR ABD - no metastases.  # Vaginitis is managed by GYN.  TREATMENT SUMMARY:  - 06/11/2024 - 10/30/2024 completed 6 cycles of CapeOx as Oxaliplatin 130 mg/m2 with 20% dose reduction = 104 mg/m2 = 145 mg IV and capecitabine 1000 mg BID 14/21D with 07/03/2024 capecitabine dose reduction to 1000 mg PO BID 14/21D due to nausea and vomiting that resolved post IV hydration with Zofran and Pepcid IV. - 01/15/2025 - 02/26/2025 restarted CapeOx as Oxaliplatin 104 mg/m2 = 145 mg IV D1 and capecitabine 1000 mg BID D1-14 Q21D - progressed after 3 cycles. - to start FOLFIRI  03/19/2025 Labs reviewed and results discussed with the patient and her daughter and her son; They want to proceed with surgery consult on Friday, 03/21/2025.  I explained MRI results to the patient and her family and we came into agreement to start arranging for the next line of the treatment with FOLFIRI as the surgery might not be an option at this time. The patient and her family were made aware of the medical status. The following were discussed: diagnosis, present clinical impression, diagnostic results, prognosis, and treatment options as well as potential risks, adverse reactions and benefits of medication. The patient was informed about risks and outcomes of untreated conditions and/or poor compliance. The patient and her family were verbalized full understanding of the above issues and will contact our office with any additional questions. The patient and her family were verbalized understanding of the necessity of the treatment, agreed to the recommendations and consent to the treatment plan to start FOLFIRI. Education material provided. See NCCN guidelines and Up-To-Date for complete list of recommendations and side effects. All questions were answered to satisfaction.  PLAN: - HOLD CHEMO TODAY as per the patient's wishes as she is going for surgery consult on 03/21/2025. - TODAY 03/19/2025 proceed with NaCl 0.9% 1000 ml IV hydration as  - to start FOLFIRI as Irinotecan 180 mg/m2 with 30% dose reduction = 126 mg/m2 = 170 mg IV over 30 to 90 minutes on D1 with Leucovorin 40 mg/m2 = 540 mg IV over 30 to 90 minutes on D1 and fluorouracil 2400 mg/m2 = 3250 mg IVC over 46 hours on D1 and the cycle is every 14 days till disease progression or unacceptable toxicity. - continue Tramadol 100 mg PO Q8H PRN for pain - She is now follow-up by pain management team for all her pain management issues and adjustments. - NGS testing to see if we could add on additional treatment options - pending. - repeat PET in 3 months - 04/2025. - F/U with Pain Management Team and GYN. - Labs today: CBC CMP Mg and additional as below in preparation to start FOLFIRI.   - RedCap for PORT placement created. RTC in 1 week with CBC CMP Mg CEA and same day to start next line of treatment. Carmela (daughter) [phone]

## 2025-03-20 NOTE — HISTORY OF PRESENT ILLNESS
[Disease: _____________________] : Disease: [unfilled] [T: ___] : T[unfilled] [N: ___] : N[unfilled] [M: ___] : M[unfilled] [AJCC Stage: ____] : AJCC Stage: [unfilled] [de-identified] : 56 y/o female w/ no significant PMHx who presented to [facility] on 3/30/24 complaining of rectal pain associated w/ BRBPR for past several weeks. Workup included a CT scan of the abdomen which demonstrated a low rectal 9cm long segment of mass-like thickening, suspicious for rectal cancer, associated w/ abscess and possible local perforation. Patient was transferred to [facility] for further evaluation and management.  Patient underwent IR drainage of x2 perirectal abscess (10cc purulent fluid drained from each) on 4/2. Abscess cultures were positive for ESBL E.coli. She completed 7 days of Zosyn prior to culture results. Antibiotics were changed to 1g q8h beginning on 4/9.  She is now s/p sigmoid loop colostomy creation with flexible sigmoidoscopy, and biopsies on 4/8/24 with Dr. Coronel. Intra-operatively, patient was noted to have a large necrotizing mass 6cm from anal verge. 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  On 4/12/24 she was cleared for discharge. A midline catheter was placed in the hospital. She will complete IV antibiotics (Ertapenem 1g QD) for 10 days from 4/12 to 4/22  She has been having CRP done as outpatient and they are coming down but still elevated and she was instructed to remain on IV antibiotics for another week.  CT C/A/P did not show any distant metastasis: 3/30 and 3/31/2024: No evidence of acute intrathoracic pathology  Nonspecific lobulated cystic structure within the uterine dome. This may reflect an atypical fibroid. Nonemergent pelvic ultrasound or MRI may be of benefit.  Most recent blood work from 4/24/2024 white blood cell count 6.4 hemoglobin 8.9 platelets 500,000 CMP normal LFTs albumin is 3.2 creatinine 0.7 rest of CMP is normal ferritin was 150 on 411 iron saturation 6%.  Most recent CRP 29 from 430 was 76 on 417  CA 19-9 11 CA 19-9 116 on 3/31/2024  She is under the care of Dr. Coronel. She was seen by inpatient onc team. She as presented in Tumor board. Plan is to start AMIRA with chemotherapy. [de-identified] : adenocarcinoma MMR proficent  [de-identified] : 5/15/2024 She is here for follow up. She had MR rectum:  TUMOR LOCATION: Mid to high rectal tumor Tumor location descriptor: Posterior Distance to anal verge (lowest extent of tumor): 6.2 cm Distance to top of anal sphincter complex (lowest extent of tumor): 3.1 cm Relationship to anterior peritoneal reflection: Straddles. TUMOR CHARACTERISTICS: Craniocaudal length: 8.4 cm Morphology: Polypoid Mucinous: Some mucin. T CATEGORY: T3c (tumor penetrates >5-15 mm beyond muscularis propria); tumor extends approximately 11 mm beyond the muscularis propria along the retrocervical region and contacts but does not definitely invade the posterior cervix (3/8.) EXTRAMURAL VASCULAR INVASION (EMVI): Present Location of EMVI (indicate series and image number): In the presacral region, along the posterior aspect of the tumor (3/15) Mesorectal Fascial (MRF) (FOR T3 ONLY): Distance between tumor and MRF: 0 mm LYMPH NODES (suspicious criteria: round shape, irregular borders, heterogeneous signal intensity): Mesorectal/Superior Rectal Lymph Nodes: N0: No visible lymph nodes/deposits Locoregional (Internal Iliac, Obturator, Inguinal below dentate line): N/A Distant (Common Iliac, External Iliac, Inguinal, Retroperitoneal): Mildly irregular left external iliac node measuring 1.0 x 0.7 cm (4/17.) She completed her antibiotics.  07/03/2024 accompanied by her daughter; Reports nausea started on 3rd day post treatment and had vomited 3 times that lasted for about 1 week. She was in ER for low grade fever and LBP, tramadol by Dr. Coronel did not work. She was d/c on Augmentin for 10 day. She feels much better now.  The patient's daughter reported the patient does not have adequate oral hydration at home.  This is the time when she felt nauseous.  07/24/2024 accompanied by her daughter Carmela and her son; Reports feeling much better after IV hydration with Zofran and Pepcid IV last week. She has no more diarrhea and/or vomiting, still with some occasional nausea.  08/21/2024 accompanied by her son; Reports feeling well at the baseline of her health status, no changes in chronic conditions or new complaints since the last visit.  09/25/2024 She is here today with her son to continue the treatment.  She mentioned that she has a very difficult situation where she may lose her insurance and will not be able to continue her cancer care.  Social work team was involved, but unfortunately unable to resolve the issue.  She will continue to work toward not losing her insurance.  She states that she feels much better after iron infusions.  10/30/2024 She is here accompanied by her daughter and her son. She is back to work on a light work schedule. She is happy to be back to work. She is feeling fine.  12/12/2024 She is here for F/U accompanied by her daughter and son. She is back to work. Still with her now chronic issues. Ready to start Chemo/RT, but not scheduled yet.  01/15/2025 accompanied by her  and daughter; Reports her pain is not getting better.  02/05/2025 She is here today for follow-up accompanied by her son and her daughter on the phone. She reports no changes in her chronic issues.  02/26/2025 she was accompanied by her family.   03/19/2025 She is here for follow-up with her daughter and her son to discuss recent MRI results.  They want to go for surgery consult.  In general she is feeling well and continues to work as much as she can.

## 2025-03-20 NOTE — ASSESSMENT
[Medication(s)] : Medication(s) [Curative] : Goals of care discussed with patient: Curative [FreeTextEntry1] : # Perforated rectal cancer, adenocarcinoma. - MMRp. - 04/08/2024 s/p sigmoid loop colostomy, was on IV antibiotics. - no metastasis on CT C/A/P. - Rectal MRI for staging showed bulky disease 8.4 cm, T3C N0 - start FOLFOX, but unfortunately PORT was not placed and is pending on Friday, she is feeling well. Plan was to add irinotecan in the future, but she is very hesitant about FOLFIRINOX and we had an extensive discussion I explained the possible benefits of additional irinotecan, but decided to proceed with just CapeOx for 3 to 4 months of treatment will aim for 6 cycles followed by long course chemoradiation. - We spoke about side effects of oxaliplatin before we spoke with capecitabine today which included rash as well as diarrhea rarely DPD deficiency severe mucositis and she is aware of approximately 1% chance of death - 05/06/2024 MR pelvis/rectal/anal - Status post diverting sigmoid loop colostomy. Small fat-containing paraumbilical hernia. A 3.2 x 3.0 x 2.3 cm mixed cystic and solid lesion within the central portion of the uterus possibly reflecting a submucosal fibroid or adenomyoma protruding into the endometrial cavity versus a true endometrial lesion. IMPRESSION: 1. Mid to high rectal tumor measuring 8.4 cm straddles the anterior peritoneal reflection, with evidence of EMVI; tumor extends beyond muscularis propria along the retrocervical region and contacts but does not definitely invade the posterior cervix. T category: T3c; 2. Mildly irregular 1.0 cm left external iliac lymph node. 3. A 3.2 cm mixed cystic and solid lesion within the central portion of the uterus was likely present dating back to August 2019, possibly reflecting a submucosal fibroid or adenomyoma protruding into the endometrial cavity; GYN consultation may be of use. - 06/11/2024 started CapeOx as Oxaliplatin and capecitabine 14/21D Q21D. - 06/16/2024 CT A/P - Unchanged oncologic findings as above with large perirectal mass and abscess now contains a small focus of air. Stable resulting right hydronephrosis from obstructing the distal ureter. No emergent findings identified. Refer to prior report from 5/21/2024 for further details. - 07/03/2024 capecitabine dose reduction to 1000 mg PO BID 14/21D due to nausea and vomiting that resolved post IV hydration with Zofran and Pepcid IV. - 09/23/2024 MR Rectal - Since the prior examination on 5/6/2024, the primary tumor shows: Partial treatment response (status post 4 of 6 cycles of chemotherapy). No new lesions. Interval decrease in size of the known mid to high rectal tumor now measuring 6.0 cm in craniocaudal dimension, previously 8.4 cm. There remains gross invasion of the perirectal fat contacting the mesorectal fascia posteriorly (8/23) and the anterior peritoneal reflection (8/13). No lymphadenopathy. - 10/30/2024 completed 6 cycles of CapeOx as Oxaliplatin 130 mg/m2 with 20% dose reduction = 104 mg/m2 = 145 mg IV and capecitabine 1000 mg BID 14/21D. - 11/22/2024 CT C/A/P - Since abdominal CT scan of June 16, 2024, Left lower quadrant colostomy device. No bowel obstruction or ascites. No significant interval change in large complex rectal/perirectal pelvic mass. No suspicious hepatic or intrathoracic lesions. - 01/08/2025 PET - Extensive FDG avid rectal mass, SUV max 14.5. 1 cm FDG avid right hilar lymph node, SUV max 11.7-suspicious for biologic tumor activity. No suspicious FDG avid pulmonary nodules or parenchymal lesions. No FDG avid adenopathy in the abdomen and pelvis.  - As she is no longer a candidate for RT, will arrange a possible EBUS reached out to pulmonary. In the interim will restart CapOx as she has more rectal pain and she has not had chemotherapy since essentially end of October. - 01/15/2025 - 02/26/2025 restarted CapeOx as Oxaliplatin 104 mg/m2 = 145 mg IV D1 and capecitabine 1000 mg BID D1-14 Q21D - progressed after 3 cycles. - Biopsy of the hilar lymph node came back as a granuloma, unfortunately CAT scans of the abdomen pelvis showed liver metastases which she is pending a biopsy.  During the hospital stay she had a nerve block and also just finished palliative radiation to the tumor. - 02/12/2025 HER2 - NEGATIVE. - 03/17/2025 MR rectum - Since prior MRI dated 9/23/2024 1. Increased size of the large heterogeneous mid to high rectal tumor measuring up to 9.2 cm in length, previously 6 cm with increased areas of tumor necrosis; new direct tumor invasion into the vagina. Findings are compatible with T4b disease. 2. New loss of the presacral fat plane with signal abnormality and enhancement of S3-S4 segments, suspicious for tumor invasion. MR ABD - no metastases.  # Vaginitis is managed by GYN.  TREATMENT SUMMARY:  - 06/11/2024 - 10/30/2024 completed 6 cycles of CapeOx as Oxaliplatin 130 mg/m2 with 20% dose reduction = 104 mg/m2 = 145 mg IV and capecitabine 1000 mg BID 14/21D with 07/03/2024 capecitabine dose reduction to 1000 mg PO BID 14/21D due to nausea and vomiting that resolved post IV hydration with Zofran and Pepcid IV. - 01/15/2025 - 02/26/2025 restarted CapeOx as Oxaliplatin 104 mg/m2 = 145 mg IV D1 and capecitabine 1000 mg BID D1-14 Q21D - progressed after 3 cycles. - to start FOLFIRI  03/19/2025 Labs reviewed and results discussed with the patient and her daughter and her son; They want to proceed with surgery consult on Friday, 03/21/2025.  I explained MRI results to the patient and her family and we came into agreement to start arranging for the next line of the treatment with FOLFIRI as the surgery might not be an option at this time. The patient and her family were made aware of the medical status. The following were discussed: diagnosis, present clinical impression, diagnostic results, prognosis, and treatment options as well as potential risks, adverse reactions and benefits of medication. The patient was informed about risks and outcomes of untreated conditions and/or poor compliance. The patient and her family were verbalized full understanding of the above issues and will contact our office with any additional questions. The patient and her family were verbalized understanding of the necessity of the treatment, agreed to the recommendations and consent to the treatment plan to start FOLFIRI. Education material provided. See NCCN guidelines and Up-To-Date for complete list of recommendations and side effects. All questions were answered to satisfaction.  PLAN: - HOLD CHEMO TODAY as per the patient's wishes as she is going for surgery consult on 03/21/2025. - TODAY 03/19/2025 proceed with NaCl 0.9% 1000 ml IV hydration as  - to start FOLFIRI as Irinotecan 180 mg/m2 with 30% dose reduction = 126 mg/m2 = 170 mg IV over 30 to 90 minutes on D1 with Leucovorin 40 mg/m2 = 540 mg IV over 30 to 90 minutes on D1 and fluorouracil 2400 mg/m2 = 3250 mg IVC over 46 hours on D1 and the cycle is every 14 days till disease progression or unacceptable toxicity. - continue Tramadol 100 mg PO Q8H PRN for pain - She is now follow-up by pain management team for all her pain management issues and adjustments. - NGS testing to see if we could add on additional treatment options - pending. - repeat PET in 3 months - 04/2025. - F/U with Pain Management Team and GYN. - Labs today: CBC CMP Mg and additional as below in preparation to start FOLFIRI.   - RedCap for PORT placement created. RTC in 1 week with CBC CMP Mg CEA and same day to start next line of treatment. Carmela (daughter) [phone]

## 2025-03-20 NOTE — HISTORY OF PRESENT ILLNESS
[Disease: _____________________] : Disease: [unfilled] [T: ___] : T[unfilled] [N: ___] : N[unfilled] [M: ___] : M[unfilled] [AJCC Stage: ____] : AJCC Stage: [unfilled] [de-identified] : 58 y/o female w/ no significant PMHx who presented to [facility] on 3/30/24 complaining of rectal pain associated w/ BRBPR for past several weeks. Workup included a CT scan of the abdomen which demonstrated a low rectal 9cm long segment of mass-like thickening, suspicious for rectal cancer, associated w/ abscess and possible local perforation. Patient was transferred to [facility] for further evaluation and management.  Patient underwent IR drainage of x2 perirectal abscess (10cc purulent fluid drained from each) on 4/2. Abscess cultures were positive for ESBL E.coli. She completed 7 days of Zosyn prior to culture results. Antibiotics were changed to 1g q8h beginning on 4/9.  She is now s/p sigmoid loop colostomy creation with flexible sigmoidoscopy, and biopsies on 4/8/24 with Dr. Coronel. Intra-operatively, patient was noted to have a large necrotizing mass 6cm from anal verge. 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  On 4/12/24 she was cleared for discharge. A midline catheter was placed in the hospital. She will complete IV antibiotics (Ertapenem 1g QD) for 10 days from 4/12 to 4/22  She has been having CRP done as outpatient and they are coming down but still elevated and she was instructed to remain on IV antibiotics for another week.  CT C/A/P did not show any distant metastasis: 3/30 and 3/31/2024: No evidence of acute intrathoracic pathology  Nonspecific lobulated cystic structure within the uterine dome. This may reflect an atypical fibroid. Nonemergent pelvic ultrasound or MRI may be of benefit.  Most recent blood work from 4/24/2024 white blood cell count 6.4 hemoglobin 8.9 platelets 500,000 CMP normal LFTs albumin is 3.2 creatinine 0.7 rest of CMP is normal ferritin was 150 on 411 iron saturation 6%.  Most recent CRP 29 from 430 was 76 on 417  CA 19-9 11 CA 19-9 116 on 3/31/2024  She is under the care of Dr. Coronel. She was seen by inpatient onc team. She as presented in Tumor board. Plan is to start AMIRA with chemotherapy. [de-identified] : adenocarcinoma MMR proficent  [de-identified] : 5/15/2024 She is here for follow up. She had MR rectum:  TUMOR LOCATION: Mid to high rectal tumor Tumor location descriptor: Posterior Distance to anal verge (lowest extent of tumor): 6.2 cm Distance to top of anal sphincter complex (lowest extent of tumor): 3.1 cm Relationship to anterior peritoneal reflection: Straddles. TUMOR CHARACTERISTICS: Craniocaudal length: 8.4 cm Morphology: Polypoid Mucinous: Some mucin. T CATEGORY: T3c (tumor penetrates >5-15 mm beyond muscularis propria); tumor extends approximately 11 mm beyond the muscularis propria along the retrocervical region and contacts but does not definitely invade the posterior cervix (3/8.) EXTRAMURAL VASCULAR INVASION (EMVI): Present Location of EMVI (indicate series and image number): In the presacral region, along the posterior aspect of the tumor (3/15) Mesorectal Fascial (MRF) (FOR T3 ONLY): Distance between tumor and MRF: 0 mm LYMPH NODES (suspicious criteria: round shape, irregular borders, heterogeneous signal intensity): Mesorectal/Superior Rectal Lymph Nodes: N0: No visible lymph nodes/deposits Locoregional (Internal Iliac, Obturator, Inguinal below dentate line): N/A Distant (Common Iliac, External Iliac, Inguinal, Retroperitoneal): Mildly irregular left external iliac node measuring 1.0 x 0.7 cm (4/17.) She completed her antibiotics.  07/03/2024 accompanied by her daughter; Reports nausea started on 3rd day post treatment and had vomited 3 times that lasted for about 1 week. She was in ER for low grade fever and LBP, tramadol by Dr. Coronel did not work. She was d/c on Augmentin for 10 day. She feels much better now.  The patient's daughter reported the patient does not have adequate oral hydration at home.  This is the time when she felt nauseous.  07/24/2024 accompanied by her daughter Carmela and her son; Reports feeling much better after IV hydration with Zofran and Pepcid IV last week. She has no more diarrhea and/or vomiting, still with some occasional nausea.  08/21/2024 accompanied by her son; Reports feeling well at the baseline of her health status, no changes in chronic conditions or new complaints since the last visit.  09/25/2024 She is here today with her son to continue the treatment.  She mentioned that she has a very difficult situation where she may lose her insurance and will not be able to continue her cancer care.  Social work team was involved, but unfortunately unable to resolve the issue.  She will continue to work toward not losing her insurance.  She states that she feels much better after iron infusions.  10/30/2024 She is here accompanied by her daughter and her son. She is back to work on a light work schedule. She is happy to be back to work. She is feeling fine.  12/12/2024 She is here for F/U accompanied by her daughter and son. She is back to work. Still with her now chronic issues. Ready to start Chemo/RT, but not scheduled yet.  01/15/2025 accompanied by her  and daughter; Reports her pain is not getting better.  02/05/2025 She is here today for follow-up accompanied by her son and her daughter on the phone. She reports no changes in her chronic issues.  02/26/2025 she was accompanied by her family.   03/19/2025 She is here for follow-up with her daughter and her son to discuss recent MRI results.  They want to go for surgery consult.  In general she is feeling well and continues to work as much as she can.

## 2025-03-20 NOTE — ASSESSMENT
[Medication(s)] : Medication(s) [Curative] : Goals of care discussed with patient: Curative [FreeTextEntry1] : # Perforated rectal cancer, adenocarcinoma. - MMRp. - 04/08/2024 s/p sigmoid loop colostomy, was on IV antibiotics. - no metastasis on CT C/A/P. - Rectal MRI for staging showed bulky disease 8.4 cm, T3C N0 - start FOLFOX, but unfortunately PORT was not placed and is pending on Friday, she is feeling well. Plan was to add irinotecan in the future, but she is very hesitant about FOLFIRINOX and we had an extensive discussion I explained the possible benefits of additional irinotecan, but decided to proceed with just CapeOx for 3 to 4 months of treatment will aim for 6 cycles followed by long course chemoradiation. - We spoke about side effects of oxaliplatin before we spoke with capecitabine today which included rash as well as diarrhea rarely DPD deficiency severe mucositis and she is aware of approximately 1% chance of death - 05/06/2024 MR pelvis/rectal/anal - Status post diverting sigmoid loop colostomy. Small fat-containing paraumbilical hernia. A 3.2 x 3.0 x 2.3 cm mixed cystic and solid lesion within the central portion of the uterus possibly reflecting a submucosal fibroid or adenomyoma protruding into the endometrial cavity versus a true endometrial lesion. IMPRESSION: 1. Mid to high rectal tumor measuring 8.4 cm straddles the anterior peritoneal reflection, with evidence of EMVI; tumor extends beyond muscularis propria along the retrocervical region and contacts but does not definitely invade the posterior cervix. T category: T3c; 2. Mildly irregular 1.0 cm left external iliac lymph node. 3. A 3.2 cm mixed cystic and solid lesion within the central portion of the uterus was likely present dating back to August 2019, possibly reflecting a submucosal fibroid or adenomyoma protruding into the endometrial cavity; GYN consultation may be of use. - 06/11/2024 started CapeOx as Oxaliplatin and capecitabine 14/21D Q21D. - 06/16/2024 CT A/P - Unchanged oncologic findings as above with large perirectal mass and abscess now contains a small focus of air. Stable resulting right hydronephrosis from obstructing the distal ureter. No emergent findings identified. Refer to prior report from 5/21/2024 for further details. - 07/03/2024 capecitabine dose reduction to 1000 mg PO BID 14/21D due to nausea and vomiting that resolved post IV hydration with Zofran and Pepcid IV. - 09/23/2024 MR Rectal - Since the prior examination on 5/6/2024, the primary tumor shows: Partial treatment response (status post 4 of 6 cycles of chemotherapy). No new lesions. Interval decrease in size of the known mid to high rectal tumor now measuring 6.0 cm in craniocaudal dimension, previously 8.4 cm. There remains gross invasion of the perirectal fat contacting the mesorectal fascia posteriorly (8/23) and the anterior peritoneal reflection (8/13). No lymphadenopathy. - 10/30/2024 completed 6 cycles of CapeOx as Oxaliplatin 130 mg/m2 with 20% dose reduction = 104 mg/m2 = 145 mg IV and capecitabine 1000 mg BID 14/21D. - 11/22/2024 CT C/A/P - Since abdominal CT scan of June 16, 2024, Left lower quadrant colostomy device. No bowel obstruction or ascites. No significant interval change in large complex rectal/perirectal pelvic mass. No suspicious hepatic or intrathoracic lesions. - 01/08/2025 PET - Extensive FDG avid rectal mass, SUV max 14.5. 1 cm FDG avid right hilar lymph node, SUV max 11.7-suspicious for biologic tumor activity. No suspicious FDG avid pulmonary nodules or parenchymal lesions. No FDG avid adenopathy in the abdomen and pelvis.  - As she is no longer a candidate for RT, will arrange a possible EBUS reached out to pulmonary. In the interim will restart CapOx as she has more rectal pain and she has not had chemotherapy since essentially end of October. - 01/15/2025 - 02/26/2025 restarted CapeOx as Oxaliplatin 104 mg/m2 = 145 mg IV D1 and capecitabine 1000 mg BID D1-14 Q21D - progressed after 3 cycles. - Biopsy of the hilar lymph node came back as a granuloma, unfortunately CAT scans of the abdomen pelvis showed liver metastases which she is pending a biopsy.  During the hospital stay she had a nerve block and also just finished palliative radiation to the tumor. - 02/12/2025 HER2 - NEGATIVE. - 03/17/2025 MR rectum - Since prior MRI dated 9/23/2024 1. Increased size of the large heterogeneous mid to high rectal tumor measuring up to 9.2 cm in length, previously 6 cm with increased areas of tumor necrosis; new direct tumor invasion into the vagina. Findings are compatible with T4b disease. 2. New loss of the presacral fat plane with signal abnormality and enhancement of S3-S4 segments, suspicious for tumor invasion. MR ABD - no metastases.  # Vaginitis is managed by GYN.  TREATMENT SUMMARY:  - 06/11/2024 - 10/30/2024 completed 6 cycles of CapeOx as Oxaliplatin 130 mg/m2 with 20% dose reduction = 104 mg/m2 = 145 mg IV and capecitabine 1000 mg BID 14/21D with 07/03/2024 capecitabine dose reduction to 1000 mg PO BID 14/21D due to nausea and vomiting that resolved post IV hydration with Zofran and Pepcid IV. - 01/15/2025 - 02/26/2025 restarted CapeOx as Oxaliplatin 104 mg/m2 = 145 mg IV D1 and capecitabine 1000 mg BID D1-14 Q21D - progressed after 3 cycles. - to start FOLFIRI  03/19/2025 Labs reviewed and results discussed with the patient and her daughter and her son; They want to proceed with surgery consult on Friday, 03/21/2025.  I explained MRI results to the patient and her family and we came into agreement to start arranging for the next line of the treatment with FOLFIRI as the surgery might not be an option at this time. The patient and her family were made aware of the medical status. The following were discussed: diagnosis, present clinical impression, diagnostic results, prognosis, and treatment options as well as potential risks, adverse reactions and benefits of medication. The patient was informed about risks and outcomes of untreated conditions and/or poor compliance. The patient and her family were verbalized full understanding of the above issues and will contact our office with any additional questions. The patient and her family were verbalized understanding of the necessity of the treatment, agreed to the recommendations and consent to the treatment plan to start FOLFIRI. Education material provided. See NCCN guidelines and Up-To-Date for complete list of recommendations and side effects. All questions were answered to satisfaction.  PLAN: - HOLD CHEMO TODAY as per the patient's wishes as she is going for surgery consult on 03/21/2025. - TODAY 03/19/2025 proceed with NaCl 0.9% 1000 ml IV hydration as  - to start FOLFIRI as Irinotecan 180 mg/m2 with 30% dose reduction = 126 mg/m2 = 170 mg IV over 30 to 90 minutes on D1 with Leucovorin 40 mg/m2 = 540 mg IV over 30 to 90 minutes on D1 and fluorouracil 2400 mg/m2 = 3250 mg IVC over 46 hours on D1 and the cycle is every 14 days till disease progression or unacceptable toxicity. - continue Tramadol 100 mg PO Q8H PRN for pain - She is now follow-up by pain management team for all her pain management issues and adjustments. - NGS testing to see if we could add on additional treatment options - pending. - repeat PET in 3 months - 04/2025. - F/U with Pain Management Team and GYN. - Labs today: CBC CMP Mg and additional as below in preparation to start FOLFIRI.   - RedCap for PORT placement created. RTC in 1 week with CBC CMP Mg CEA and same day to start next line of treatment. Carmela (daughter) [phone]

## 2025-03-20 NOTE — END OF VISIT
[FreeTextEntry3] : I was physically present for the key portions of the evaluation and management service provided.  I agree with the history and physical, and plan which I have reviewed and edited where appropriate.   She is here for follow-up unfortunately she underwent an MRI which is showing growth in her rectal tumor.  Therefore she progressed on CapeOx.  Will chemotherapy recommended we switch to FOLFIRI she is pending an opinion from the surgical team regarding resection.  If still not a candidate for surgery which is likely the case we will proceed with second line FOLFIRI also ordered NGS testing we could add on cetuximab if a candidate we will proceed with 30% reduction of irinotecan we will send off testing including [id number].  She will see us back with cycle 1 of treatment

## 2025-03-20 NOTE — REVIEW OF SYSTEMS
[Fatigue] : fatigue [Vaginal Discharge] : vaginal discharge [Negative] : Allergic/Immunologic [FreeTextEntry7] : rectal bleeding - resolved.

## 2025-03-20 NOTE — PHYSICAL EXAM
[Restricted in physically strenuous activity but ambulatory and able to carry out work of a light or sedentary nature] : Status 1- Restricted in physically strenuous activity but ambulatory and able to carry out work of a light or sedentary nature, e.g., light house work, office work [Normal] : affect appropriate [de-identified] : ostomy - looks well.

## 2025-03-20 NOTE — HISTORY OF PRESENT ILLNESS
[Disease: _____________________] : Disease: [unfilled] [T: ___] : T[unfilled] [N: ___] : N[unfilled] [M: ___] : M[unfilled] [AJCC Stage: ____] : AJCC Stage: [unfilled] [de-identified] : 58 y/o female w/ no significant PMHx who presented to [facility] on 3/30/24 complaining of rectal pain associated w/ BRBPR for past several weeks. Workup included a CT scan of the abdomen which demonstrated a low rectal 9cm long segment of mass-like thickening, suspicious for rectal cancer, associated w/ abscess and possible local perforation. Patient was transferred to [facility] for further evaluation and management.  Patient underwent IR drainage of x2 perirectal abscess (10cc purulent fluid drained from each) on 4/2. Abscess cultures were positive for ESBL E.coli. She completed 7 days of Zosyn prior to culture results. Antibiotics were changed to 1g q8h beginning on 4/9.  She is now s/p sigmoid loop colostomy creation with flexible sigmoidoscopy, and biopsies on 4/8/24 with Dr. Coronel. Intra-operatively, patient was noted to have a large necrotizing mass 6cm from anal verge. 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  On 4/12/24 she was cleared for discharge. A midline catheter was placed in the hospital. She will complete IV antibiotics (Ertapenem 1g QD) for 10 days from 4/12 to 4/22  She has been having CRP done as outpatient and they are coming down but still elevated and she was instructed to remain on IV antibiotics for another week.  CT C/A/P did not show any distant metastasis: 3/30 and 3/31/2024: No evidence of acute intrathoracic pathology  Nonspecific lobulated cystic structure within the uterine dome. This may reflect an atypical fibroid. Nonemergent pelvic ultrasound or MRI may be of benefit.  Most recent blood work from 4/24/2024 white blood cell count 6.4 hemoglobin 8.9 platelets 500,000 CMP normal LFTs albumin is 3.2 creatinine 0.7 rest of CMP is normal ferritin was 150 on 411 iron saturation 6%.  Most recent CRP 29 from 430 was 76 on 417  CA 19-9 11 CA 19-9 116 on 3/31/2024  She is under the care of Dr. Coronel. She was seen by inpatient onc team. She as presented in Tumor board. Plan is to start AMIRA with chemotherapy. [de-identified] : adenocarcinoma MMR proficent  [de-identified] : 5/15/2024 She is here for follow up. She had MR rectum:  TUMOR LOCATION: Mid to high rectal tumor Tumor location descriptor: Posterior Distance to anal verge (lowest extent of tumor): 6.2 cm Distance to top of anal sphincter complex (lowest extent of tumor): 3.1 cm Relationship to anterior peritoneal reflection: Straddles. TUMOR CHARACTERISTICS: Craniocaudal length: 8.4 cm Morphology: Polypoid Mucinous: Some mucin. T CATEGORY: T3c (tumor penetrates >5-15 mm beyond muscularis propria); tumor extends approximately 11 mm beyond the muscularis propria along the retrocervical region and contacts but does not definitely invade the posterior cervix (3/8.) EXTRAMURAL VASCULAR INVASION (EMVI): Present Location of EMVI (indicate series and image number): In the presacral region, along the posterior aspect of the tumor (3/15) Mesorectal Fascial (MRF) (FOR T3 ONLY): Distance between tumor and MRF: 0 mm LYMPH NODES (suspicious criteria: round shape, irregular borders, heterogeneous signal intensity): Mesorectal/Superior Rectal Lymph Nodes: N0: No visible lymph nodes/deposits Locoregional (Internal Iliac, Obturator, Inguinal below dentate line): N/A Distant (Common Iliac, External Iliac, Inguinal, Retroperitoneal): Mildly irregular left external iliac node measuring 1.0 x 0.7 cm (4/17.) She completed her antibiotics.  07/03/2024 accompanied by her daughter; Reports nausea started on 3rd day post treatment and had vomited 3 times that lasted for about 1 week. She was in ER for low grade fever and LBP, tramadol by Dr. Coronel did not work. She was d/c on Augmentin for 10 day. She feels much better now.  The patient's daughter reported the patient does not have adequate oral hydration at home.  This is the time when she felt nauseous.  07/24/2024 accompanied by her daughter Carmela and her son; Reports feeling much better after IV hydration with Zofran and Pepcid IV last week. She has no more diarrhea and/or vomiting, still with some occasional nausea.  08/21/2024 accompanied by her son; Reports feeling well at the baseline of her health status, no changes in chronic conditions or new complaints since the last visit.  09/25/2024 She is here today with her son to continue the treatment.  She mentioned that she has a very difficult situation where she may lose her insurance and will not be able to continue her cancer care.  Social work team was involved, but unfortunately unable to resolve the issue.  She will continue to work toward not losing her insurance.  She states that she feels much better after iron infusions.  10/30/2024 She is here accompanied by her daughter and her son. She is back to work on a light work schedule. She is happy to be back to work. She is feeling fine.  12/12/2024 She is here for F/U accompanied by her daughter and son. She is back to work. Still with her now chronic issues. Ready to start Chemo/RT, but not scheduled yet.  01/15/2025 accompanied by her  and daughter; Reports her pain is not getting better.  02/05/2025 She is here today for follow-up accompanied by her son and her daughter on the phone. She reports no changes in her chronic issues.  02/26/2025 she was accompanied by her family.   03/19/2025 She is here for follow-up with her daughter and her son to discuss recent MRI results.  They want to go for surgery consult.  In general she is feeling well and continues to work as much as she can.

## 2025-03-20 NOTE — PHYSICAL EXAM
[Restricted in physically strenuous activity but ambulatory and able to carry out work of a light or sedentary nature] : Status 1- Restricted in physically strenuous activity but ambulatory and able to carry out work of a light or sedentary nature, e.g., light house work, office work [Normal] : affect appropriate [de-identified] : ostomy - looks well.

## 2025-03-21 NOTE — REASON FOR VISIT
[Follow-Up Visit] : a follow-up visit for [Rectal Cancer] : rectal cancer [Family Member] : family member [Home] : at home, [unfilled] , at the time of the visit. [Medical Office: ([facility])___] : at the medical office located in  [Telehealth (audio & video)] : This visit was provided via telehealth using real-time 2-way audio visual technology. [Verbal consent obtained from patient] : the patient, [unfilled]

## 2025-03-21 NOTE — REASON FOR VISIT
[Follow-Up Visit] : a follow-up visit for [Rectal Cancer] : rectal cancer [Family Member] : family member [Home] : at home, [unfilled] , at the time of the visit. [Medical Office: ([facility])___] : at the medical office located in  [Telehealth (audio & video)] : This visit was provided via telehealth using real-time 2-way audio visual technology. [Verbal consent obtained from patient] : the patient, [unfilled]

## 2025-03-28 NOTE — HISTORY OF PRESENT ILLNESS
[de-identified] : Ms. ANN MERCER is a 57-year-old female who presents for follow-up regarding perforated rectal cancer diagnosed in 2024 s/p surgery, systemic chemo, and RT. 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 RN. never had a colonoscopy.  From [place], noted to have normal blood work in 2023.    Patient initially presented to [facility] on 3/30/24 complaining of rectal pain associated w/ BRBPR for several weeks. Workup included a CT scan of the abdomen which demonstrated a low rectal 9 cm long segment of mass-like thickening, suspicious for rectal cancer, associated w/ abscess and possible local perforation. Patient was transferred to [facility] for further evaluation and management. Ca 19-9 was 116 at that time. She underwent IR drainage x2 for perirectal abscess on 2024; cultures positive for ESBL E. coli for which she was on a long course of antibiotics.  S/p sigmoid loop colostomy on 2024 with Dr. Coronel. Intra-operatively, patient was noted to have a large necrotizing mass 6cm from anal verge. 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  Underwent 4 months of systemic CapeOx on  - 10/30/2024.  Rectal MRI 2024 demonstrated partial response to treatment (4/6 cycles at that time). No new lesion. Interval decrease in size of the known mid-high rectal tumor, now 6.0 cm previously 8.4 cm, with gross invasion of the perirectal fat contacting the mesorectal fascia posteriorly/anterior peritoneal reflection. No lymphadenopathy seen.   CAP CT 2024 showed no significant interval change in large complex rectal/perirectal pelvic mass. No suspicious hepatic or intrathoracic lesions. Plan was to start concurrent chemo/RT; saw rad/onc on 2024.  PET/CT 2025 ordered by rad/onc was impressionable for the followin. Extensive FDG avid rectal mass, SUV max 14.5.  2. 1 cm FDG avid right hilar lymph node, SUV max 11.7-suspicious for biologic tumor activity.  3. No suspicious FDG avid pulmonary nodules or parenchymal lesions. No FDG avid adenopathy in the abdomen and pelvis.   After 10/30/2024, she was off chemo for 2 months, then subsequently resumed CapeOx on 1/15/2025.  Recently presented to the ED on 2025 complaining of persistent rectal pain x1 week. CT 2025 noted a new 1.7 cm hypodense lesion in the left lobe, suspicious for mets; unchanged rectal/pelvic mass measuring 10 cm. New mild left hyponephrosis/hydroureter to the level of the pelvic mass was also noted. Pulmonary was consulted; patient s/p EBUS with FNA of Hilar node on 2025 which was negative for malignant cells.  Patient s/p 5 fractions of RT  - 2025. (CEA: 178 on 25).   At 25 follow-up, reported fatigue and weakness. Her pain medication was changed from Tramadol to morphine 2 weeks ago for severe pain. She was off of therapy for 2 months after 2024, resumed 1/15/25 and in the interim she developed a liver lesion suspicious for mets. I discussed that normally after radiation therapy we re-image the tumor in 6-8 weeks with a rectal MRI; will additionally plan to get a liver MRI. At that point can consider resection of her primary tumor as well as the liver lesion, likely in conjunction with colorectal surg Dr. Lucia- imaging will be shared with him.   Rectal MRI 2025 demonstrated increase in size of the large heterogeneous mid to high rectal tumor measuring 9.2 cm, previously 6 cm, with increased areas of tumor necrosis. There was note of new direct tumor invasion into the vagina and new loss off the presacral fat plane with signal abnormality and enhancement of S3-S4 segments, suspicious for tumor invasion. Liver MRI 2025 showed no evidence of definite intra-abdominal metastatic disease.  Patient presents for follow-up. She endorses rectal and tailbone pain if she does not take pain medication every 6 hours. She has not recently followed up with colorectal Dr. Lucia; states that the last time she consulted with him he stated he did not think her condition was amenable to surgery at that time. She reports recent vaginal discharge for which she presented to her GYN, found to have yeast infection which was treated. Denies history of bladder infections.   Labs: 25 - CEA: 178 25 - CEA: 36.8

## 2025-03-28 NOTE — ASSESSMENT
[FreeTextEntry1] : 58 y/o F with pmhx of rectal adenocarcinoma s/p chemotherapy and currently undergoing radiation referred to me for evaluation and management of cancer-associated pain. I have seen this patient as an inpatient in the hospital recently and performed a ganglion impar block for her severe rectal pain with >50% relief. Additionally, patient is currently taking prescribed Morphine ER, Morphine IR, acetaminophen and ibuprofen. She states that her pain is currently well controlled. Is here for medication refill.   Interval HX (3/28/25): Reports pain well controlled at this time on current regimen. Is seeing a colorectal surgeon at [facility] who has suggested to remove her rectal mass. Awaiting final confirmation, was told mass could not be removed until at least 1 month after last chemotherapy. Will let me know if surgery is scheduled. Otherwise, doing well apart from continued low appetite. Will follow up with oncologist about possible appetite stimulants.    #Cancer-associated pain -Patient's pain well controlled on current regimen. -Continue Morphine ER 15 mg q8hrs JANELL. -Continue Morphine IR 15 mg q6hrs PRN severe breakthrough pain. -Acetaminophen 975 mg PO q8hrs PRN  -Ibuprofen 400 mg PO q8hrs PRN -If patient's pain worsens as underlying disease progresses, will consider neurolytic ganglion impar block as regular ganglion impar block provided significant relief.  -Follow up in a month.

## 2025-03-28 NOTE — HISTORY OF PRESENT ILLNESS
[FreeTextEntry1] : 58 y/o F with pmhx of rectal adenocarcinoma s/p chemotherapy and currently undergoing radiation referred to me for evaluation and management of cancer-associated pain. I have seen this patient as an inpatient in the hospital recently and performed a ganglion impar block for her severe rectal pain with >50% relief. Additionally, patient is currently taking prescribed Morphine ER, Morphine IR, acetaminophen and ibuprofen. She states that her pain is currently well controlled. Is here for medication refill.   Interval HX (2/28/25): Pain well controlled at this time. Needs refill today. [___ yrs] : [unfilled] year(s) ago [Constant] : constant [4] : an average pain level of 4/10 [1] : a minimum pain level of 1/10 [8] : a maximum pain level of 8/10 [Sharp] : sharp [Aching] : aching [Throbbing] : throbbing [Sitting] : sitting [Transitioning] : transitioning [Laying] : laying [FreeTextEntry7] : Rectal pain

## 2025-03-28 NOTE — ASSESSMENT
[FreeTextEntry1] : Ms. ANN MERCER is a 57-year-old female who presents via telehealth for initial consultation regarding perforated rectal cancer diagnosed in 2024.  S/p sigmoid loop colostomy on 2024 with Dr. Coronel. 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  S/p 4 months of systemic CapeOx on  - 10/30/2024. Was off chemo for 2 months then subsequently resumed CapeOx on 1/15/2025.  PET/CT 2025 ordered by rad/onc was impressionable for the followin. Extensive FDG avid rectal mass, SUV max 14.5.  2. 1 cm FDG avid right hilar lymph node, SUV max 11.7-suspicious for biologic tumor activity.  3. No suspicious FDG avid pulmonary nodules or parenchymal lesions. No FDG avid adenopathy in the abdomen and pelvis.   Recently presented to the ED on 2025 complaining of persistent rectal pain x1 week. CT 2025 noted a new 1.7 cm hypodense lesion in the left lobe, suspicious for mets; unchanged rectal/pelvic mass measuring 10 cm.  Patient s/p 5 fractions of RT  - 2025. (CEA: 178 on 25, 25 - CEA: 36.8).   She was off of therapy for 2 months after 2024, resumed 1/15/25 and in the interim she developed a liver lesion suspicious for mets. I discussed that normally after radiation therapy we re-image the tumor in 6-8 weeks with a rectal MRI; will additionally plan to get a liver MRI. At that point can consider resection of her primary tumor as well as the liver lesion, likely in conjunction with colorectal surg Dr. Lucia- imaging will be shared with him.   Rectal MRI 2025 demonstrated increase in size of the large heterogeneous mid to high rectal tumor measuring 9.2 cm, previously 6 cm, with increased areas of tumor necrosis. There was note of new direct tumor invasion into the vagina and new loss off the presacral fat plane with signal abnormality and enhancement of S3-S4 segments, suspicious for tumor invasion. Liver MRI 2025 showed no evidence of definite intra-abdominal metastatic disease.  Patient presents for follow-up. I discussed that imaging looks good from a liver standpoint and demonstrates a good response to chemotherapy, liver lesions have decreased in size substantially. Regarding the primary tumor, I discussed that it appears to be eroding into the vaginal wall and patient has been having vaginal discharge and reports recent yeast infection. We will provide referral to another colorectal surgeon to consider resection via abdominal perineal resection with YESSI-[id number]. Advised them to have the MRI put on a disc prior to follow-up.   Today, I personally spent 35 minutes in total time including reviewing imaging and studies, discussing complex treatment regimens, direct face to face time with the patient, patient education and counseling.

## 2025-03-28 NOTE — HISTORY OF PRESENT ILLNESS
[de-identified] : Ms. ANN MERCER is a 57-year-old female who presents for follow-up regarding perforated rectal cancer diagnosed in 2024 s/p surgery, systemic chemo, and RT. 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 never had a colonoscopy.  From [place], noted to have normal blood work in 2023.    Patient initially presented to [facility] on 3/30/24 complaining of rectal pain associated w/ BRBPR for several weeks. Workup included a CT scan of the abdomen which demonstrated a low rectal 9 cm long segment of mass-like thickening, suspicious for rectal cancer, associated w/ abscess and possible local perforation. Patient was transferred to [facility] for further evaluation and management. Ca 19-9 was 116 at that time. She underwent IR drainage x2 for perirectal abscess on 2024; cultures positive for ESBL E. coli for which she was on a long course of antibiotics.  S/p sigmoid loop colostomy on 2024 with Dr. Coronel. Intra-operatively, patient was noted to have a large necrotizing mass 6cm from anal verge. 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  Underwent 4 months of systemic CapeOx on  - 10/30/2024.  Rectal MRI 2024 demonstrated partial response to treatment (4/6 cycles at that time). No new lesion. Interval decrease in size of the known mid-high rectal tumor, now 6.0 cm previously 8.4 cm, with gross invasion of the perirectal fat contacting the mesorectal fascia posteriorly/anterior peritoneal reflection. No lymphadenopathy seen.   CAP CT 2024 showed no significant interval change in large complex rectal/perirectal pelvic mass. No suspicious hepatic or intrathoracic lesions. Plan was to start concurrent chemo/RT; saw rad/onc on 2024.  PET/CT 2025 ordered by rad/onc was impressionable for the followin. Extensive FDG avid rectal mass, SUV max 14.5.  2. 1 cm FDG avid right hilar lymph node, SUV max 11.7-suspicious for biologic tumor activity.  3. No suspicious FDG avid pulmonary nodules or parenchymal lesions. No FDG avid adenopathy in the abdomen and pelvis.   After 10/30/2024, she was off chemo for 2 months, then subsequently resumed CapeOx on 1/15/2025.  Recently presented to the ED on 2025 complaining of persistent rectal pain x1 week. CT 2025 noted a new 1.7 cm hypodense lesion in the left lobe, suspicious for mets; unchanged rectal/pelvic mass measuring 10 cm. New mild left hyponephrosis/hydroureter to the level of the pelvic mass was also noted. Pulmonary was consulted; patient s/p EBUS with FNA of Hilar node on 2025 which was negative for malignant cells.  Patient s/p 5 fractions of RT  - 2025. (CEA: 178 on 25).   At 25 follow-up, reported fatigue and weakness. Her pain medication was changed from Tramadol to morphine 2 weeks ago for severe pain. She was off of therapy for 2 months after 2024, resumed 1/15/25 and in the interim she developed a liver lesion suspicious for mets. I discussed that normally after radiation therapy we re-image the tumor in 6-8 weeks with a rectal MRI; will additionally plan to get a liver MRI. At that point can consider resection of her primary tumor as well as the liver lesion, likely in conjunction with colorectal surg Dr. Lucia- imaging will be shared with him.   Rectal MRI 2025 demonstrated increase in size of the large heterogeneous mid to high rectal tumor measuring 9.2 cm, previously 6 cm, with increased areas of tumor necrosis. There was note of new direct tumor invasion into the vagina and new loss off the presacral fat plane with signal abnormality and enhancement of S3-S4 segments, suspicious for tumor invasion. Liver MRI 2025 showed no evidence of definite intra-abdominal metastatic disease.  Patient presents for follow-up. She endorses rectal and tailbone pain if she does not take pain medication every 6 hours. She has not recently followed up with colorectal Dr. Lucia; states that the last time she consulted with him he stated he did not think her condition was amenable to surgery at that time. She reports recent vaginal discharge for which she presented to her GYN, found to have yeast infection which was treated. Denies history of bladder infections.   Labs: 25 - CEA: 178 25 - CEA: 36.8

## 2025-05-31 NOTE — ASSESSMENT
[FreeTextEntry1] : 58 y/o F with pmhx of rectal adenocarcinoma s/p chemotherapy and currently undergoing radiation referred to me for evaluation and management of cancer-associated pain. I have seen this patient as an inpatient in the hospital recently and performed a ganglion impar block for her severe rectal pain with >50% relief. Additionally, patient is currently taking prescribed Morphine ER, Morphine IR, acetaminophen and ibuprofen. She states that her pain is currently well controlled. Is here for medication refill.   Interval HX (3/28/25): Reports pain well controlled at this time on current regimen. Is seeing a colorectal surgeon at [facility] who has suggested to remove her rectal mass. Awaiting final confirmation, was told mass could not be removed until at least 1 month after last chemotherapy. Will let me know if surgery is scheduled. Otherwise, doing well apart from continued low appetite. Will follow up with oncologist about possible appetite stimulants.    Interval HX (5/25): Patient had extensive resection for rectal adenocarcinoma at [facility] complicated by long hospital course following with significant uncontrolled pain. She has also experienced a significant weight loss of over 20 lbs but is following up with nutrition management. She continues to feel severe pain, now worsened by acute post-surgical pain. She is not yet sure whether or not she needs to do any adjunctive chemotherapy or radiation but is following up with her oncologist soon.   #Cancer-associated pain -Morphine ER 15 mg q12hrs JANELL. -Morphine IR 30 mg q6hrs PRN severe breakthrough pain. -Acetaminophen 975 mg PO q8hrs PRN  -Ibuprofen 400 mg PO q8hrs PRN -Methocarbamol 750 mg PO QID JANELL -Follow up in a month at [facility], [place].

## 2025-05-31 NOTE — HISTORY OF PRESENT ILLNESS
[FreeTextEntry1] : 58 y/o F with pmhx of rectal adenocarcinoma s/p chemotherapy and currently undergoing radiation referred to me for evaluation and management of cancer-associated pain. I have seen this patient as an inpatient in the hospital recently and performed a ganglion impar block for her severe rectal pain with >50% relief. Additionally, patient is currently taking prescribed Morphine ER, Morphine IR, acetaminophen and ibuprofen. She states that her pain is currently well controlled. Is here for medication refill.   Interval HX (2/28/25): Pain well controlled at this time. Needs refill today.  Interval HX (5/25): Patient had extensive resection for rectal adenocarcinoma at [facility] complicated by long hospital course following with significant uncontrolled pain. She has also experienced a significant weight loss of over 20 lbs but is following up with nutrition management. She continues to feel severe pain, now worsened by acute post-surgical pain. She is not yet sure whether or not she needs to do any adjunctive chemotherapy or radiation but is following up with her oncologist soon. [___ yrs] : [unfilled] year(s) ago [Constant] : constant [4] : an average pain level of 4/10 [1] : a minimum pain level of 1/10 [8] : a maximum pain level of 8/10 [Sharp] : sharp [Aching] : aching [Throbbing] : throbbing [Sitting] : sitting [Transitioning] : transitioning [Laying] : laying [FreeTextEntry7] : Rectal pain

## 2025-06-18 NOTE — PHYSICAL EXAM
[Restricted in physically strenuous activity but ambulatory and able to carry out work of a light or sedentary nature] : Status 1- Restricted in physically strenuous activity but ambulatory and able to carry out work of a light or sedentary nature, e.g., light house work, office work [Normal] : affect appropriate [de-identified] : ostomy - looks well.

## 2025-06-18 NOTE — ASSESSMENT
[FreeTextEntry1] : # Perforated rectal cancer, adenocarcinoma. - MMRp. - 04/08/2024 s/p sigmoid loop colostomy, was on IV antibiotics. - no metastasis on CT C/A/P. - Rectal MRI for staging showed bulky disease 8.4 cm, T3C N0 - start FOLFOX, but unfortunately PORT was not placed and is pending on Friday, she is feeling well. Plan was to add irinotecan in the future, but she is very hesitant about FOLFIRINOX and we had an extensive discussion I explained the possible benefits of additional irinotecan, but decided to proceed with just CapeOx for 3 to 4 months of treatment will aim for 6 cycles followed by long course chemoradiation. - We spoke about side effects of oxaliplatin before we spoke with capecitabine today which included rash as well as diarrhea rarely DPD deficiency severe mucositis and she is aware of approximately 1% chance of death - 05/06/2024 MR pelvis/rectal/anal - Status post diverting sigmoid loop colostomy. Small fat-containing paraumbilical hernia. A 3.2 x 3.0 x 2.3 cm mixed cystic and solid lesion within the central portion of the uterus possibly reflecting a submucosal fibroid or adenomyoma protruding into the endometrial cavity versus a true endometrial lesion. IMPRESSION: 1. Mid to high rectal tumor measuring 8.4 cm straddles the anterior peritoneal reflection, with evidence of EMVI; tumor extends beyond muscularis propria along the retrocervical region and contacts but does not definitely invade the posterior cervix. T category: T3c; 2. Mildly irregular 1.0 cm left external iliac lymph node. 3. A 3.2 cm mixed cystic and solid lesion within the central portion of the uterus was likely present dating back to August 2019, possibly reflecting a submucosal fibroid or adenomyoma protruding into the endometrial cavity; GYN consultation may be of use. - 06/11/2024 started CapeOx as Oxaliplatin and capecitabine 14/21D Q21D. - 06/16/2024 CT A/P - Unchanged oncologic findings as above with large perirectal mass and abscess now contains a small focus of air. Stable resulting right hydronephrosis from obstructing the distal ureter. No emergent findings identified. Refer to prior report from 5/21/2024 for further details. - 07/03/2024 capecitabine dose reduction to 1000 mg PO BID 14/21D due to nausea and vomiting that resolved post IV hydration with Zofran and Pepcid IV. - 09/23/2024 MR Rectal - Since the prior examination on 5/6/2024, the primary tumor shows: Partial treatment response (status post 4 of 6 cycles of chemotherapy). No new lesions. Interval decrease in size of the known mid to high rectal tumor now measuring 6.0 cm in craniocaudal dimension, previously 8.4 cm. There remains gross invasion of the perirectal fat contacting the mesorectal fascia posteriorly (8/23) and the anterior peritoneal reflection (8/13). No lymphadenopathy. - 10/30/2024 completed 6 cycles of CapeOx as Oxaliplatin 130 mg/m2 with 20% dose reduction = 104 mg/m2 = 145 mg IV and capecitabine 1000 mg BID 14/21D. - 11/22/2024 CT C/A/P - Since abdominal CT scan of June 16, 2024, Left lower quadrant colostomy device. No bowel obstruction or ascites. No significant interval change in large complex rectal/perirectal pelvic mass. No suspicious hepatic or intrathoracic lesions. - 01/08/2025 PET - Extensive FDG avid rectal mass, SUV max 14.5. 1 cm FDG avid right hilar lymph node, SUV max 11.7-suspicious for biologic tumor activity. No suspicious FDG avid pulmonary nodules or parenchymal lesions. No FDG avid adenopathy in the abdomen and pelvis.  - As she is no longer a candidate for RT, will arrange a possible EBUS reached out to pulmonary. In the interim will restart CapOx as she has more rectal pain and she has not had chemotherapy since essentially end of October. - 01/15/2025 - 02/26/2025 restarted CapeOx as Oxaliplatin 104 mg/m2 = 145 mg IV D1 and capecitabine 1000 mg BID D1-14 Q21D - progressed after 3 cycles. - Biopsy of the hilar lymph node came back as a granuloma, unfortunately CAT scans of the abdomen pelvis showed liver metastases which she is pending a biopsy.  During the hospital stay she had a nerve block and also just finished palliative radiation to the tumor. - 02/12/2025 HER2 - NEGATIVE. - 03/17/2025 MR rectum - Since prior MRI dated 9/23/2024 1. Increased size of the large heterogeneous mid to high rectal tumor measuring up to 9.2 cm in length, previously 6 cm with increased areas of tumor necrosis; new direct tumor invasion into the vagina. Findings are compatible with T4b disease. 2. New loss of the presacral fat plane with signal abnormality and enhancement of S3-S4 segments, suspicious for tumor invasion. MR ABD - no metastases.  # Vaginitis is managed by GYN.  TREATMENT SUMMARY:  - 06/11/2024 - 10/30/2024 completed 6 cycles of CapeOx as Oxaliplatin 130 mg/m2 with 20% dose reduction = 104 mg/m2 = 145 mg IV and capecitabine 1000 mg BID 14/21D with 07/03/2024 capecitabine dose reduction to 1000 mg PO BID 14/21D due to nausea and vomiting that resolved post IV hydration with Zofran and Pepcid IV. - 01/15/2025 - 02/26/2025 restarted CapeOx as Oxaliplatin 104 mg/m2 = 145 mg IV D1 and capecitabine 1000 mg BID D1-14 Q21D - progressed after 3 cycles. - to start FOLFIRI  03/19/2025 Labs reviewed and results discussed with the patient and her daughter and her son; They want to proceed with surgery consult on Friday, 03/21/2025.  I explained MRI results to the patient and her family and we came into agreement to start arranging for the next line of the treatment with FOLFIRI as the surgery might not be an option at this time. The patient and her family were made aware of the medical status. The following were discussed: diagnosis, present clinical impression, diagnostic results, prognosis, and treatment options as well as potential risks, adverse reactions and benefits of medication. The patient was informed about risks and outcomes of untreated conditions and/or poor compliance. The patient and her family were verbalized full understanding of the above issues and will contact our office with any additional questions. The patient and her family were verbalized understanding of the necessity of the treatment, agreed to the recommendations and consent to the treatment plan to start FOLFIRI. Education material provided. See NCCN guidelines and Up-To-Date for complete list of recommendations and side effects. All questions were answered to satisfaction.  PLAN: - HOLD CHEMO TODAY as per the patient's wishes as she is going for surgery consult on 03/21/2025. - TODAY 03/19/2025 proceed with NaCl 0.9% 1000 ml IV hydration as  - to start FOLFIRI as Irinotecan 180 mg/m2 with 30% dose reduction = 126 mg/m2 = 170 mg IV over 30 to 90 minutes on D1 with Leucovorin 40 mg/m2 = 540 mg IV over 30 to 90 minutes on D1 and fluorouracil 2400 mg/m2 = 3250 mg IVC over 46 hours on D1 and the cycle is every 14 days till disease progression or unacceptable toxicity. - continue Tramadol 100 mg PO Q8H PRN for pain - She is now follow-up by pain management team for all her pain management issues and adjustments. - NGS testing to see if we could add on additional treatment options - pending. - repeat PET in 3 months - 04/2025. - F/U with Pain Management Team and GYN. - Labs today: CBC CMP Mg and additional as below in preparation to start FOLFIRI.   - RedCap for PORT placement created. RTC in 1 week with CBC CMP Mg CEA and same day to start next line of treatment. Carmela (daughter) [phone] [Medication(s)] : Medication(s) [Curative] : Goals of care discussed with patient: Curative

## 2025-06-18 NOTE — HISTORY OF PRESENT ILLNESS
[Disease: _____________________] : Disease: [unfilled] [T: ___] : T[unfilled] [N: ___] : N[unfilled] [M: ___] : M[unfilled] [AJCC Stage: ____] : AJCC Stage: [unfilled] [de-identified] : 58 y/o female w/ no significant PMHx who presented to [facility] on 3/30/24 complaining of rectal pain associated w/ BRBPR for past several weeks. Workup included a CT scan of the abdomen which demonstrated a low rectal 9cm long segment of mass-like thickening, suspicious for rectal cancer, associated w/ abscess and possible local perforation. Patient was transferred to [facility] for further evaluation and management.  Patient underwent IR drainage of x2 perirectal abscess (10cc purulent fluid drained from each) on 4/2. Abscess cultures were positive for ESBL E.coli. She completed 7 days of Zosyn prior to culture results. Antibiotics were changed to 1g q8h beginning on 4/9.  She is now s/p sigmoid loop colostomy creation with flexible sigmoidoscopy, and biopsies on 4/8/24 with Dr. Coronel. Intra-operatively, patient was noted to have a large necrotizing mass 6cm from anal verge. 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  On 4/12/24 she was cleared for discharge. A midline catheter was placed in the hospital. She will complete IV antibiotics (Ertapenem 1g QD) for 10 days from 4/12 to 4/22  She has been having CRP done as outpatient and they are coming down but still elevated and she was instructed to remain on IV antibiotics for another week.  CT C/A/P did not show any distant metastasis: 3/30 and 3/31/2024: No evidence of acute intrathoracic pathology  Nonspecific lobulated cystic structure within the uterine dome. This may reflect an atypical fibroid. Nonemergent pelvic ultrasound or MRI may be of benefit.  Most recent blood work from 4/24/2024 white blood cell count 6.4 hemoglobin 8.9 platelets 500,000 CMP normal LFTs albumin is 3.2 creatinine 0.7 rest of CMP is normal ferritin was 150 on 411 iron saturation 6%.  Most recent CRP 29 from 430 was 76 on 417  CA 19-9 11 CA 19-9 116 on 3/31/2024  She is under the care of Dr. Coronel. She was seen by inpatient onc team. She as presented in Tumor board. Plan is to start AMIRA with chemotherapy. [de-identified] : adenocarcinoma MMR proficent  [de-identified] : 5/15/2024 She is here for follow up. She had MR rectum:  TUMOR LOCATION: Mid to high rectal tumor Tumor location descriptor: Posterior Distance to anal verge (lowest extent of tumor): 6.2 cm Distance to top of anal sphincter complex (lowest extent of tumor): 3.1 cm Relationship to anterior peritoneal reflection: Straddles. TUMOR CHARACTERISTICS: Craniocaudal length: 8.4 cm Morphology: Polypoid Mucinous: Some mucin. T CATEGORY: T3c (tumor penetrates >5-15 mm beyond muscularis propria); tumor extends approximately 11 mm beyond the muscularis propria along the retrocervical region and contacts but does not definitely invade the posterior cervix (3/8.) EXTRAMURAL VASCULAR INVASION (EMVI): Present Location of EMVI (indicate series and image number): In the presacral region, along the posterior aspect of the tumor (3/15) Mesorectal Fascial (MRF) (FOR T3 ONLY): Distance between tumor and MRF: 0 mm LYMPH NODES (suspicious criteria: round shape, irregular borders, heterogeneous signal intensity): Mesorectal/Superior Rectal Lymph Nodes: N0: No visible lymph nodes/deposits Locoregional (Internal Iliac, Obturator, Inguinal below dentate line): N/A Distant (Common Iliac, External Iliac, Inguinal, Retroperitoneal): Mildly irregular left external iliac node measuring 1.0 x 0.7 cm (4/17.) She completed her antibiotics.  07/03/2024 accompanied by her daughter; Reports nausea started on 3rd day post treatment and had vomited 3 times that lasted for about 1 week. She was in ER for low grade fever and LBP, tramadol by Dr. Coronel did not work. She was d/c on Augmentin for 10 day. She feels much better now.  The patient's daughter reported the patient does not have adequate oral hydration at home.  This is the time when she felt nauseous.  07/24/2024 accompanied by her daughter Carmela and her son; Reports feeling much better after IV hydration with Zofran and Pepcid IV last week. She has no more diarrhea and/or vomiting, still with some occasional nausea.  08/21/2024 accompanied by her son; Reports feeling well at the baseline of her health status, no changes in chronic conditions or new complaints since the last visit.  09/25/2024 She is here today with her son to continue the treatment.  She mentioned that she has a very difficult situation where she may lose her insurance and will not be able to continue her cancer care.  Social work team was involved, but unfortunately unable to resolve the issue.  She will continue to work toward not losing her insurance.  She states that she feels much better after iron infusions.  10/30/2024 She is here accompanied by her daughter and her son. She is back to work on a light work schedule. She is happy to be back to work. She is feeling fine.  12/12/2024 She is here for F/U accompanied by her daughter and son. She is back to work. Still with her now chronic issues. Ready to start Chemo/RT, but not scheduled yet.  01/15/2025 accompanied by her  and daughter; Reports her pain is not getting better.  02/05/2025 She is here today for follow-up accompanied by her son and her daughter on the phone. She reports no changes in her chronic issues.  02/26/2025 she was accompanied by her family.   03/19/2025 She is here for follow-up with her daughter and her son to discuss recent MRI results.  They want to go for surgery consult.  In general she is feeling well and continues to work as much as she can.

## 2025-06-18 NOTE — END OF VISIT
[FreeTextEntry3] : I was physically present for the key portions of the evaluation and management service provided.  I agree with the history and physical, and plan which I have reviewed and edited where appropriate.   She is here for follow-up unfortunately she underwent an MRI which is showing growth in her rectal tumor.  Therefore she progressed on CapeOx.  Will chemotherapy recommended we switch to FOLFIRI she is pending an opinion from the surgical team regarding resection.  If still not a candidate for surgery which is likely the case we will proceed with second line FOLFIRI also ordered NGS testing we could add on cetuximab if a candidate we will proceed with 30% reduction of irinotecan we will send off testing including UGT1A1.  She will see us back with cycle 1 of treatment A-T Advancement Flap Text: The defect edges were debeveled with a #15 scalpel blade.  Given the location of the defect, shape of the defect and the proximity to free margins an A-T advancement flap was deemed most appropriate.  Using a sterile surgical marker, an appropriate advancement flap was drawn incorporating the defect and placing the expected incisions within the relaxed skin tension lines where possible.    The area thus outlined was incised deep to adipose tissue with a #15 scalpel blade.  The skin margins were undermined to an appropriate distance in all directions utilizing iris scissors.

## 2025-07-22 NOTE — HISTORY OF PRESENT ILLNESS
[FreeTextEntry1] : 56 y/o F with pmhx of rectal adenocarcinoma s/p chemotherapy and currently undergoing radiation referred to me for evaluation and management of cancer-associated pain. I have seen this patient as an inpatient in the hospital recently and performed a ganglion impar block for her severe rectal pain with >50% relief. Additionally, patient is currently taking prescribed Morphine ER, Morphine IR, acetaminophen and ibuprofen. She states that her pain is currently well controlled. Is here for medication refill.   Interval HX (2/28/25): Pain well controlled at this time. Needs refill today.  Interval HX (5/25): Patient had extensive resection for rectal adenocarcinoma at [facility] complicated by long hospital course following with significant uncontrolled pain. She has also experienced a significant weight loss of over 20 lbs but is following up with nutrition management. She continues to feel severe pain, now worsened by acute post-surgical pain. She is not yet sure whether or not she needs to do any adjunctive chemotherapy or radiation but is following up with her oncologist soon.  Interval HX (6/30/25): Patient doing well over the last month, still with pain, improving. Currently may not need any more chemo/radiation but is following up with surgeon at [facility] within next couple of weeks.   Interval HX (7/25): Patient still having significant pain. Surgery may be complicated by fistula, abscess or persistent collection.  [___ yrs] : [unfilled] year(s) ago [Constant] : constant [4] : an average pain level of 4/10 [1] : a minimum pain level of 1/10 [8] : a maximum pain level of 8/10 [Sharp] : sharp [Aching] : aching [Throbbing] : throbbing [Sitting] : sitting [Transitioning] : transitioning [Laying] : laying [FreeTextEntry7] : Rectal pain

## 2025-07-22 NOTE — ASSESSMENT
[FreeTextEntry1] : 56 y/o F with pmhx of rectal adenocarcinoma s/p chemotherapy and currently undergoing radiation referred to me for evaluation and management of cancer-associated pain. I have seen this patient as an inpatient in the hospital recently and performed a ganglion impar block for her severe rectal pain with >50% relief. Additionally, patient is currently taking prescribed Morphine ER, Morphine IR, acetaminophen and ibuprofen. She states that her pain is currently well controlled. Is here for medication refill.   Interval HX (3/28/25): Reports pain well controlled at this time on current regimen. Is seeing a colorectal surgeon at [facility] who has suggested to remove her rectal mass. Awaiting final confirmation, was told mass could not be removed until at least 1 month after last chemotherapy. Will let me know if surgery is scheduled. Otherwise, doing well apart from continued low appetite. Will follow up with oncologist about possible appetite stimulants.    Interval HX (5/25): Patient had extensive resection for rectal adenocarcinoma at [facility] complicated by long hospital course following with significant uncontrolled pain. She has also experienced a significant weight loss of over 20 lbs but is following up with nutrition management. She continues to feel severe pain, now worsened by acute post-surgical pain. She is not yet sure whether or not she needs to do any adjunctive chemotherapy or radiation but is following up with her oncologist soon.  Interval HX (6/30/25): Patient doing well over the last month, still with pain, improving. Currently may not need any more chemo/radiation but is following up with surgeon at [facility] within next couple of weeks.   Interval HX (7/25): Patient still having significant pain. Surgery may be complicated by fistula, abscess or persistent collection.   #Cancer-associated pain -Morphine ER 15 mg q12hrs JANELL. -Morphine IR 30 mg q4 hrs PRN; will not continue to decrease until abscess/fistula resolved. -Acetaminophen 975 mg PO q8hrs PRN  -Ibuprofen 400 mg PO q8hrs PRN -Methocarbamol 750 mg PO QID JANELL -Follow up in a month at [facility].